# Patient Record
Sex: MALE | Race: WHITE | NOT HISPANIC OR LATINO | Employment: FULL TIME | URBAN - METROPOLITAN AREA
[De-identification: names, ages, dates, MRNs, and addresses within clinical notes are randomized per-mention and may not be internally consistent; named-entity substitution may affect disease eponyms.]

---

## 2017-01-19 RX ORDER — LOSARTAN POTASSIUM 50 MG/1
TABLET ORAL
Qty: 60 TABLET | Refills: 2 | Status: SHIPPED | OUTPATIENT
Start: 2017-01-19 | End: 2017-10-18 | Stop reason: SDUPTHER

## 2017-01-30 ENCOUNTER — TELEPHONE (OUTPATIENT)
Dept: FAMILY MEDICINE | Facility: CLINIC | Age: 52
End: 2017-01-30

## 2017-01-30 NOTE — TELEPHONE ENCOUNTER
Spoke to pt. Advised rx was sent in and also medical records request will be handled by medical records. Call transferred.

## 2017-01-30 NOTE — TELEPHONE ENCOUNTER
----- Message from Adamaris Palomo sent at 1/27/2017  2:30 PM CST -----  pharmacy sent refill request for losartan ... His company sent a request for medical records/ asking for an update .. Not sure if the request went to medical records  ... Call pt [phone]   [facility] [address] [address]  Phone: [phone] Fax: [phone]

## 2017-07-24 RX ORDER — HYDROCHLOROTHIAZIDE 25 MG/1
TABLET ORAL
Qty: 30 TABLET | OUTPATIENT
Start: 2017-07-24

## 2017-07-24 RX ORDER — LOSARTAN POTASSIUM 50 MG/1
TABLET ORAL
Qty: 60 TABLET | OUTPATIENT
Start: 2017-07-24

## 2017-10-18 ENCOUNTER — OFFICE VISIT (OUTPATIENT)
Dept: FAMILY MEDICINE | Facility: CLINIC | Age: 52
End: 2017-10-18
Payer: COMMERCIAL

## 2017-10-18 ENCOUNTER — LAB VISIT (OUTPATIENT)
Dept: LAB | Facility: HOSPITAL | Age: 52
End: 2017-10-18
Attending: NURSE PRACTITIONER
Payer: COMMERCIAL

## 2017-10-18 VITALS
HEART RATE: 55 BPM | TEMPERATURE: 98 F | DIASTOLIC BLOOD PRESSURE: 106 MMHG | HEIGHT: 70 IN | RESPIRATION RATE: 16 BRPM | SYSTOLIC BLOOD PRESSURE: 152 MMHG | OXYGEN SATURATION: 96 % | WEIGHT: 245.56 LBS | BODY MASS INDEX: 35.15 KG/M2

## 2017-10-18 DIAGNOSIS — R53.83 FATIGUE, UNSPECIFIED TYPE: ICD-10-CM

## 2017-10-18 DIAGNOSIS — I10 HYPERTENSION, UNSPECIFIED TYPE: Primary | ICD-10-CM

## 2017-10-18 DIAGNOSIS — Z11.59 NEED FOR HEPATITIS C SCREENING TEST: ICD-10-CM

## 2017-10-18 DIAGNOSIS — I10 HYPERTENSION, UNSPECIFIED TYPE: ICD-10-CM

## 2017-10-18 DIAGNOSIS — G47.33 OSA (OBSTRUCTIVE SLEEP APNEA): ICD-10-CM

## 2017-10-18 LAB
ALBUMIN SERPL BCP-MCNC: 3.6 G/DL
ALP SERPL-CCNC: 71 U/L
ALT SERPL W/O P-5'-P-CCNC: 49 U/L
ANION GAP SERPL CALC-SCNC: 6 MMOL/L
AST SERPL-CCNC: 41 U/L
BASOPHILS # BLD AUTO: 0.03 K/UL
BASOPHILS NFR BLD: 0.7 %
BILIRUB SERPL-MCNC: 0.6 MG/DL
BUN SERPL-MCNC: 16 MG/DL
CALCIUM SERPL-MCNC: 9.3 MG/DL
CHLORIDE SERPL-SCNC: 107 MMOL/L
CHOLEST SERPL-MCNC: 212 MG/DL
CHOLEST/HDLC SERPL: 4.4 {RATIO}
CO2 SERPL-SCNC: 29 MMOL/L
CREAT SERPL-MCNC: 1.1 MG/DL
DIFFERENTIAL METHOD: ABNORMAL
EOSINOPHIL # BLD AUTO: 0.2 K/UL
EOSINOPHIL NFR BLD: 4.3 %
ERYTHROCYTE [DISTWIDTH] IN BLOOD BY AUTOMATED COUNT: 13.8 %
EST. GFR  (AFRICAN AMERICAN): >60 ML/MIN/1.73 M^2
EST. GFR  (NON AFRICAN AMERICAN): >60 ML/MIN/1.73 M^2
GLUCOSE SERPL-MCNC: 106 MG/DL
HCT VFR BLD AUTO: 45.4 %
HDLC SERPL-MCNC: 48 MG/DL
HDLC SERPL: 22.6 %
HGB BLD-MCNC: 14.2 G/DL
IMM GRANULOCYTES # BLD AUTO: 0.01 K/UL
IMM GRANULOCYTES NFR BLD AUTO: 0.2 %
LDLC SERPL CALC-MCNC: 138.2 MG/DL
LYMPHOCYTES # BLD AUTO: 2.4 K/UL
LYMPHOCYTES NFR BLD: 55.6 %
MCH RBC QN AUTO: 26.8 PG
MCHC RBC AUTO-ENTMCNC: 31.3 G/DL
MCV RBC AUTO: 86 FL
MONOCYTES # BLD AUTO: 0.4 K/UL
MONOCYTES NFR BLD: 9 %
NEUTROPHILS # BLD AUTO: 1.3 K/UL
NEUTROPHILS NFR BLD: 30.2 %
NONHDLC SERPL-MCNC: 164 MG/DL
NRBC BLD-RTO: 0 /100 WBC
PLATELET # BLD AUTO: 261 K/UL
PMV BLD AUTO: 11.4 FL
POTASSIUM SERPL-SCNC: 4.4 MMOL/L
PROT SERPL-MCNC: 7.3 G/DL
RBC # BLD AUTO: 5.29 M/UL
SODIUM SERPL-SCNC: 142 MMOL/L
TRIGL SERPL-MCNC: 129 MG/DL
WBC # BLD AUTO: 4.23 K/UL

## 2017-10-18 PROCEDURE — 99214 OFFICE O/P EST MOD 30 MIN: CPT | Mod: S$GLB,,, | Performed by: NURSE PRACTITIONER

## 2017-10-18 PROCEDURE — 86803 HEPATITIS C AB TEST: CPT

## 2017-10-18 PROCEDURE — 99999 PR PBB SHADOW E&M-EST. PATIENT-LVL III: CPT | Mod: PBBFAC,,, | Performed by: NURSE PRACTITIONER

## 2017-10-18 PROCEDURE — 80053 COMPREHEN METABOLIC PANEL: CPT

## 2017-10-18 PROCEDURE — 80061 LIPID PANEL: CPT

## 2017-10-18 PROCEDURE — 85025 COMPLETE CBC W/AUTO DIFF WBC: CPT

## 2017-10-18 PROCEDURE — 36415 COLL VENOUS BLD VENIPUNCTURE: CPT | Mod: PO

## 2017-10-18 RX ORDER — HYDROCHLOROTHIAZIDE 25 MG/1
25 TABLET ORAL DAILY
Qty: 90 TABLET | Refills: 1 | Status: SHIPPED | OUTPATIENT
Start: 2017-10-18 | End: 2018-02-08 | Stop reason: SDUPTHER

## 2017-10-18 RX ORDER — LOSARTAN POTASSIUM 50 MG/1
50 TABLET ORAL 2 TIMES DAILY
Qty: 180 TABLET | Refills: 1 | Status: SHIPPED | OUTPATIENT
Start: 2017-10-18 | End: 2018-02-08 | Stop reason: SDUPTHER

## 2017-10-18 NOTE — PROGRESS NOTES
Subjective:       Patient ID: Ken Lozord Barthelemy is a 52 y.o. male.    Chief Complaint: Hypertension (blood pressure meds )    Here for med refill. Ran out of HCTZ and losartan 2 months ago.   /80     Has been snoring - seen by Dr Rene  - and never did study   Fatigue - had been exploring testosterone - and had labs and studies  Took testosterone with no relief in fatigue - so he stopped it   He states that he works 16 hrs and sleeps hard when he gets home + snoring at home          Hypertension   This is a chronic problem. The current episode started more than 1 month ago. The problem is uncontrolled. Pertinent negatives include no anxiety, blurred vision, chest pain, headaches, malaise/fatigue, neck pain, orthopnea, palpitations, peripheral edema, PND, shortness of breath or sweats. Risk factors for coronary artery disease include male gender, obesity and sedentary lifestyle. Past treatments include angiotensin blockers and diuretics.     Vitals:    10/18/17 0754   BP: (!) 152/106   Pulse: (!) 55   Resp: 16   Temp: 98.1 °F (36.7 °C)     Review of Systems   Constitutional: Negative.  Negative for fatigue, fever and malaise/fatigue.   HENT: Negative.    Eyes: Negative.  Negative for blurred vision.   Respiratory: Negative.  Negative for cough and shortness of breath.    Cardiovascular: Negative.  Negative for chest pain, palpitations, orthopnea and PND.   Gastrointestinal: Negative.  Negative for abdominal pain, diarrhea and nausea.   Endocrine: Negative.    Genitourinary: Negative.  Negative for dysuria and hematuria.   Musculoskeletal: Positive for arthralgias. Negative for neck pain.   Skin: Negative.  Negative for color change and rash.   Allergic/Immunologic: Negative.    Neurological: Negative.  Negative for numbness and headaches.   Hematological: Negative.    Psychiatric/Behavioral: Negative.        Past Medical History:   Diagnosis Date    Diverticulitis 2005    May 2005 and Nov 2005     Hypertension      Objective:      Physical Exam   Constitutional: He is oriented to person, place, and time. He appears well-developed and well-nourished.   HENT:   Head: Normocephalic and atraumatic.   Mouth/Throat: Oropharynx is clear and moist.   Eyes: Conjunctivae and EOM are normal. Pupils are equal, round, and reactive to light.   Neck: Normal range of motion. Neck supple.   Cardiovascular: Normal rate, regular rhythm, normal heart sounds and intact distal pulses.  Exam reveals no friction rub.    No murmur heard.  Pulmonary/Chest: Effort normal and breath sounds normal. No respiratory distress. He has no wheezes. He has no rales.   Abdominal: Soft. Bowel sounds are normal.   Musculoskeletal: Normal range of motion.   Neurological: He is alert and oriented to person, place, and time.   Skin: Skin is warm and dry.   Psychiatric: He has a normal mood and affect. His behavior is normal. Judgment and thought content normal.   Nursing note and vitals reviewed.      Assessment:       1. Hypertension, unspecified type    2. REENA (obstructive sleep apnea)    3. Need for hepatitis C screening test    4. Fatigue, unspecified type        Plan:       Hypertension, unspecified type  -     CBC auto differential; Future; Expected date: 10/18/2017  -     Comprehensive metabolic panel; Future; Expected date: 10/18/2017  -     Lipid panel; Future; Expected date: 10/18/2017  -     Microalbumin/creatinine urine ratio; Future; Expected date: 10/18/2017      losartan (COZAAR) 50 MG tablet; Take 1 tablet (50 mg total) by mouth 2 (two) times daily.  Dispense: 180 tablet; Refill: 1  -     hydrochlorothiazide (HYDRODIURIL) 25 MG tablet; Take 1 tablet (25 mg total) by mouth once daily.  Dispense: 90 tablet; Refill: 1      REENA (obstructive sleep apnea)   - never got study - will explore if bp remains elevated     Need for hepatitis C screening test  -     HEPATITIS C ANTIBODY; Future; Expected date: 10/18/2017    Fatigue, unspecified  type  - discussed that will explore sleep study if not better   He discussed that he feels that this is related to working long hours     Labs today - will FU   Refused flu today   Make appt with PCP     -         No Follow-up on file.

## 2017-10-19 LAB — HCV AB SERPL QL IA: NEGATIVE

## 2017-10-23 ENCOUNTER — TELEPHONE (OUTPATIENT)
Dept: FAMILY MEDICINE | Facility: CLINIC | Age: 52
End: 2017-10-23

## 2017-10-23 DIAGNOSIS — R79.89 ELEVATED LFTS: Primary | ICD-10-CM

## 2017-10-23 NOTE — TELEPHONE ENCOUNTER
Please let him know that all his labs look good except that his liver functions have increased - I want him to decrease alcohol intake and OTC advil and tylenol  - I want him to repeat this hepatic panel in 2 weeks    Also make sure healthy eating -= watch high chol foods

## 2017-10-24 ENCOUNTER — PATIENT MESSAGE (OUTPATIENT)
Dept: FAMILY MEDICINE | Facility: CLINIC | Age: 52
End: 2017-10-24

## 2017-10-24 NOTE — TELEPHONE ENCOUNTER
Most insurance do not pay for these tests with just history of illnesses - If he does not have symptoms then most are hard to get authorized. He would need to contact the place that did the physical or see a PCP here earlier to have information documented and symptoms and BP follow up for more documentation to try to get it authorized

## 2017-10-24 NOTE — TELEPHONE ENCOUNTER
Patient is stating that work is requiring him to have a stress test. Wants you to order? Has appt scheduled with Dr Rene 2/8/18

## 2017-10-24 NOTE — TELEPHONE ENCOUNTER
Why is his job asking for stress test? He has HTN - that he was not taking his meds for - I would need more information

## 2017-10-24 NOTE — TELEPHONE ENCOUNTER
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 Stated that they are wanting him to do this because of his blood pressure and cholesterol. Please advise

## 2017-11-06 ENCOUNTER — LAB VISIT (OUTPATIENT)
Dept: LAB | Facility: HOSPITAL | Age: 52
End: 2017-11-06
Attending: NURSE PRACTITIONER
Payer: COMMERCIAL

## 2017-11-06 ENCOUNTER — CLINICAL SUPPORT (OUTPATIENT)
Dept: FAMILY MEDICINE | Facility: CLINIC | Age: 52
End: 2017-11-06
Payer: COMMERCIAL

## 2017-11-06 VITALS — OXYGEN SATURATION: 97 % | DIASTOLIC BLOOD PRESSURE: 82 MMHG | HEART RATE: 58 BPM | SYSTOLIC BLOOD PRESSURE: 126 MMHG

## 2017-11-06 DIAGNOSIS — I10 HYPERTENSION, UNSPECIFIED TYPE: Primary | ICD-10-CM

## 2017-11-06 DIAGNOSIS — R79.89 ELEVATED LFTS: ICD-10-CM

## 2017-11-06 LAB
ALBUMIN SERPL BCP-MCNC: 3.6 G/DL
ALP SERPL-CCNC: 80 U/L
ALT SERPL W/O P-5'-P-CCNC: 44 U/L
AST SERPL-CCNC: 27 U/L
BILIRUB DIRECT SERPL-MCNC: 0.2 MG/DL
BILIRUB SERPL-MCNC: 0.4 MG/DL
PROT SERPL-MCNC: 7.4 G/DL

## 2017-11-06 PROCEDURE — 80076 HEPATIC FUNCTION PANEL: CPT

## 2017-11-06 PROCEDURE — 99999 PR PBB SHADOW E&M-EST. PATIENT-LVL I: CPT | Mod: PBBFAC,,,

## 2017-11-06 PROCEDURE — 36415 COLL VENOUS BLD VENIPUNCTURE: CPT | Mod: PO

## 2017-11-06 NOTE — PROGRESS NOTES
Ken Lozord Barthelemy 52 y.o. male is here today for Blood Pressure check.   History of HTN yes.    Review of patient's allergies indicates:  No Known Allergies  Creatinine   Date Value Ref Range Status   10/18/2017 1.1 0.5 - 1.4 mg/dL Final     Sodium   Date Value Ref Range Status   10/18/2017 142 136 - 145 mmol/L Final     Potassium   Date Value Ref Range Status   10/18/2017 4.4 3.5 - 5.1 mmol/L Final   ]  Patient verifies taking blood pressure medications on a regular basis at the same time of the day.     Current Outpatient Prescriptions:     hydrochlorothiazide (HYDRODIURIL) 25 MG tablet, Take 1 tablet (25 mg total) by mouth once daily., Disp: 90 tablet, Rfl: 1    losartan (COZAAR) 50 MG tablet, Take 1 tablet (50 mg total) by mouth 2 (two) times daily., Disp: 180 tablet, Rfl: 1  Does patient have record of home blood pressure readings yes. Readings have been averaging 124/80.   Last dose of blood pressure medication was taken at this morning at 0800.  Patient is asymptomatic.   No complaints at this time    ,   .     Blood pressure reading after 15 minutes was 126/82, Pulse 58.  Vickie Majano NP notified.

## 2018-02-08 ENCOUNTER — OFFICE VISIT (OUTPATIENT)
Dept: FAMILY MEDICINE | Facility: CLINIC | Age: 53
End: 2018-02-08
Payer: COMMERCIAL

## 2018-02-08 ENCOUNTER — HOSPITAL ENCOUNTER (OUTPATIENT)
Dept: RADIOLOGY | Facility: HOSPITAL | Age: 53
Discharge: HOME | End: 2018-02-08
Attending: INTERNAL MEDICINE
Payer: COMMERCIAL

## 2018-02-08 VITALS
WEIGHT: 247.81 LBS | BODY MASS INDEX: 35.48 KG/M2 | OXYGEN SATURATION: 98 % | HEIGHT: 70 IN | RESPIRATION RATE: 18 BRPM | SYSTOLIC BLOOD PRESSURE: 118 MMHG | DIASTOLIC BLOOD PRESSURE: 80 MMHG | HEART RATE: 70 BPM

## 2018-02-08 DIAGNOSIS — M25.562 ACUTE PAIN OF LEFT KNEE: ICD-10-CM

## 2018-02-08 DIAGNOSIS — I10 ESSENTIAL HYPERTENSION: ICD-10-CM

## 2018-02-08 DIAGNOSIS — L30.9 ECZEMA, UNSPECIFIED TYPE: ICD-10-CM

## 2018-02-08 DIAGNOSIS — Z00.00 ROUTINE PHYSICAL EXAMINATION: Primary | ICD-10-CM

## 2018-02-08 DIAGNOSIS — R74.01 TRANSAMINITIS: ICD-10-CM

## 2018-02-08 DIAGNOSIS — E78.5 DYSLIPIDEMIA: ICD-10-CM

## 2018-02-08 PROCEDURE — 99396 PREV VISIT EST AGE 40-64: CPT | Mod: S$GLB,,, | Performed by: INTERNAL MEDICINE

## 2018-02-08 PROCEDURE — 73564 X-RAY EXAM KNEE 4 OR MORE: CPT | Mod: 26,50,, | Performed by: RADIOLOGY

## 2018-02-08 PROCEDURE — 99999 PR PBB SHADOW E&M-EST. PATIENT-LVL III: CPT | Mod: PBBFAC,,, | Performed by: INTERNAL MEDICINE

## 2018-02-08 PROCEDURE — 73564 X-RAY EXAM KNEE 4 OR MORE: CPT | Mod: TC,50,FY,PO

## 2018-02-08 RX ORDER — HYDROCHLOROTHIAZIDE 25 MG/1
25 TABLET ORAL DAILY
Qty: 6 TABLET | Refills: 0 | Status: SHIPPED | OUTPATIENT
Start: 2018-02-08 | End: 2018-02-08 | Stop reason: SDUPTHER

## 2018-02-08 RX ORDER — LOSARTAN POTASSIUM 50 MG/1
50 TABLET ORAL 2 TIMES DAILY
Qty: 180 TABLET | Refills: 2
Start: 2018-02-08 | End: 2018-08-29 | Stop reason: SDUPTHER

## 2018-02-08 RX ORDER — HYDROCHLOROTHIAZIDE 25 MG/1
25 TABLET ORAL DAILY
Qty: 90 TABLET | Refills: 2
Start: 2018-02-08 | End: 2018-08-29 | Stop reason: SDUPTHER

## 2018-02-08 RX ORDER — LOSARTAN POTASSIUM 50 MG/1
50 TABLET ORAL 2 TIMES DAILY
Qty: 12 TABLET | Refills: 0 | Status: SHIPPED | OUTPATIENT
Start: 2018-02-08 | End: 2018-02-08 | Stop reason: SDUPTHER

## 2018-02-08 NOTE — PROGRESS NOTES
Subjective:       Patient ID: Ken Lozord Barthelemy is a 52 y.o. male.    Chief Complaint: Annual Exam    Here for routine health maintenance.    Complains of rash that improved with lotion  Complains of left knee stiffness and swelling with mild pain starting 2 days ago.  HTN - controlled  HLD - mildly uncontrolled LDL goal < 130 age, htn  Elevated LFT - mild last lab only      Review of Systems   Constitutional: Negative for appetite change and fever.   HENT: Negative for nosebleeds and trouble swallowing.    Eyes: Negative for discharge and visual disturbance.   Respiratory: Negative for choking and shortness of breath.    Cardiovascular: Negative for chest pain and palpitations.   Gastrointestinal: Negative for abdominal pain, nausea and vomiting.   Musculoskeletal: Positive for arthralgias. Negative for joint swelling.   Skin: Negative for rash and wound.   Neurological: Negative for dizziness and syncope.   Psychiatric/Behavioral: Negative for confusion and dysphoric mood.       Objective:      Vitals:    02/08/18 0814   BP: 118/80   Pulse: 70   Resp: 18     Physical Exam   Constitutional: He appears well-nourished.   Eyes: Conjunctivae and EOM are normal.   Neck: Trachea normal and normal range of motion. No thyromegaly present.   Cardiovascular: Normal heart sounds.    Edema negative   Pulmonary/Chest: Effort normal and breath sounds normal.   Abdominal: Soft. There is no hepatomegaly.   Musculoskeletal:   ROM normal bilateral  Strength normal bilateral  Left knee mildly swollen; non TTP   Neurological: He has normal reflexes. No cranial nerve deficit.   Skin: Skin is warm, dry and intact.   Psychiatric: He has a normal mood and affect.   Alert and Oriented    Vitals reviewed.        Assessment:       1. Routine physical examination    2. Essential hypertension    3. Dyslipidemia    4. Transaminitis    5. Acute pain of left knee    6. Eczema, unspecified type        Plan:       Routine physical  "examination    Essential hypertension  -     Discontinue: hydroCHLOROthiazide (HYDRODIURIL) 25 MG tablet; Take 1 tablet (25 mg total) by mouth once daily.  Dispense: 6 tablet; Refill: 0  -     Discontinue: losartan (COZAAR) 50 MG tablet; Take 1 tablet (50 mg total) by mouth 2 (two) times daily.  Dispense: 12 tablet; Refill: 0  -     Comprehensive metabolic panel; Future; Expected date: 02/08/2018  -     losartan (COZAAR) 50 MG tablet; Take 1 tablet (50 mg total) by mouth 2 (two) times daily.  Dispense: 180 tablet; Refill: 2  -     hydroCHLOROthiazide (HYDRODIURIL) 25 MG tablet; Take 1 tablet (25 mg total) by mouth once daily.  Dispense: 90 tablet; Refill: 2    Dyslipidemia  -     Lipid panel; Future; Expected date: 02/08/2018    Transaminitis  -     Comprehensive metabolic panel; Future; Expected date: 02/08/2018    Acute pain of left knee  -     Ambulatory referral to Physical Medicine Rehab  -     X-ray Knee Ortho Bilateral with Flexion; Future; Expected date: 02/08/2018    Eczema, unspecified type      Medication List with Changes/Refills   Changed and/or Refilled Medications    Modified Medication Previous Medication    HYDROCHLOROTHIAZIDE (HYDRODIURIL) 25 MG TABLET hydrochlorothiazide (HYDRODIURIL) 25 MG tablet       Take 1 tablet (25 mg total) by mouth once daily.    Take 1 tablet (25 mg total) by mouth once daily.    LOSARTAN (COZAAR) 50 MG TABLET losartan (COZAAR) 50 MG tablet       Take 1 tablet (50 mg total) by mouth 2 (two) times daily.    Take 1 tablet (50 mg total) by mouth 2 (two) times daily.     Wellness reviewed        Counseled on regular exercise, maintenance of a healthy weight, balanced diet rich in fruits/vegetables and lean protein, and avoidance of unhealthy habits like smoking and excessive alcohol intake.   Also, counseled on importance of being compliant with medication, health appointments, diet and exercise.     Follow-up in about 1 year (around 2/8/2019).      "This note will not be " "shared with the patient."  "

## 2018-02-22 ENCOUNTER — INITIAL CONSULT (OUTPATIENT)
Dept: PHYSICAL MEDICINE AND REHAB | Facility: CLINIC | Age: 53
End: 2018-02-22
Payer: COMMERCIAL

## 2018-02-22 VITALS
DIASTOLIC BLOOD PRESSURE: 80 MMHG | BODY MASS INDEX: 35.36 KG/M2 | HEIGHT: 70 IN | HEART RATE: 62 BPM | WEIGHT: 247 LBS | SYSTOLIC BLOOD PRESSURE: 132 MMHG

## 2018-02-22 DIAGNOSIS — M71.22 BAKER'S CYST OF KNEE, LEFT: ICD-10-CM

## 2018-02-22 DIAGNOSIS — G89.29 CHRONIC PAIN OF LEFT KNEE: Primary | ICD-10-CM

## 2018-02-22 DIAGNOSIS — M25.562 CHRONIC PAIN OF LEFT KNEE: Primary | ICD-10-CM

## 2018-02-22 PROCEDURE — 99999 PR PBB SHADOW E&M-EST. PATIENT-LVL III: CPT | Mod: PBBFAC,,, | Performed by: PHYSICAL MEDICINE & REHABILITATION

## 2018-02-22 PROCEDURE — 20611 DRAIN/INJ JOINT/BURSA W/US: CPT | Mod: LT,S$GLB,, | Performed by: PHYSICAL MEDICINE & REHABILITATION

## 2018-02-22 PROCEDURE — 99243 OFF/OP CNSLTJ NEW/EST LOW 30: CPT | Mod: 25,S$GLB,, | Performed by: PHYSICAL MEDICINE & REHABILITATION

## 2018-02-22 RX ORDER — BETAMETHASONE SODIUM PHOSPHATE AND BETAMETHASONE ACETATE 3; 3 MG/ML; MG/ML
6 INJECTION, SUSPENSION INTRA-ARTICULAR; INTRALESIONAL; INTRAMUSCULAR; SOFT TISSUE
Status: DISCONTINUED | OUTPATIENT
Start: 2018-02-22 | End: 2018-02-22 | Stop reason: HOSPADM

## 2018-02-22 RX ADMIN — BETAMETHASONE SODIUM PHOSPHATE AND BETAMETHASONE ACETATE 6 MG: 3; 3 INJECTION, SUSPENSION INTRA-ARTICULAR; INTRALESIONAL; INTRAMUSCULAR; SOFT TISSUE at 01:02

## 2018-02-22 NOTE — PROGRESS NOTES
[facility]    Consulting Provider: Dr. Ernesto Rene    CHIEF COMPLAINT:   Chief Complaint   Patient presents with    Knee Pain     left knee pain     HISTORY OF PRESENT ILLNESS: Ken Lozord Barthelemy is a 52 y.o. male who presents to me for the first time for evaluation and treatment of left knee pain.  The pain started 2 weeks ago insidiously without any trauma or injury.  He rates his pain as a 2 on a scale of 1-10.  He describes the pain is pressure.  He feels there was some swelling to the swelling is reduced.  He locates his primary area pain of the posterior aspect of the knee.  He feels the area is very tight.  He has been taking ibuprofen which does seem to help.  He's had a prior traumatic injuries or surgeries to the left knee.  He denies any numbness or tingling.  There is no grinding or locking symptoms.    Review of Systems   Constitutional: Negative for fever.   HENT: Negative for drooling.    Eyes: Negative for discharge.   Respiratory: Negative for choking.    Cardiovascular: Negative for chest pain.   Genitourinary: Negative for flank pain.   Skin: Negative for wound.   Allergic/Immunologic: Negative for immunocompromised state.   Neurological: Negative for tremors and syncope.   Psychiatric/Behavioral: Negative for behavioral problems.     Past Medical History:   Past Medical History:   Diagnosis Date    Diverticulitis 2005    May 2005 and Nov 2005    Hypertension        Past Surgical History:   Past Surgical History:   Procedure Laterality Date    APPENDECTOMY  11/2005    COLONOSCOPY  4/26/2005  [facility]    Diverticulosis    COLONOSCOPY N/A 9/18/2015    Procedure: COLONOSCOPY;  Surgeon: Yahir Serrano Jr., MD;  [facility];  Service: Endoscopy;  Laterality: N/A;    MULTIPLE TOOTH EXTRACTIONS      SIGMOIDECTOMY  [facility]/17/2006  [facility]    Laparoscopic sigmoidectomy, for recurrent diverticulitis.       Family History:   Family History   Problem Relation Age of Onset  "   Heart disease Father        Medications:   Current Outpatient Prescriptions on File Prior to Visit   Medication Sig Dispense Refill    hydroCHLOROthiazide (HYDRODIURIL) 25 MG tablet Take 1 tablet (25 mg total) by mouth once daily. 90 tablet 2    losartan (COZAAR) 50 MG tablet Take 1 tablet (50 mg total) by mouth 2 (two) times daily. 180 tablet 2     No current facility-administered medications on file prior to visit.        Allergies: Review of patient's allergies indicates:  No Known Allergies    Social History:   Social History     Social History    Marital status:      Spouse name: N/A    Number of children: N/A    Years of education: N/A     Social History Main Topics    Smoking status: Former Smoker     Types: Cigars     Quit date: 9/28/2012    Smokeless tobacco: Never Used    Alcohol use 1.2 oz/week     2 Shots of liquor per week      Comment: 1-2 glasses/day    Drug use: No    Sexual activity: Not Asked     Other Topics Concern    None     Social History Narrative    None     Rayo officiates high school football.    PHYSICAL EXAMINATION:   General    Vitals:    02/22/18 1306   BP: 132/80   Pulse: 62   Weight: 112 kg (247 lb)   Height: 5' 10" (1.778 m)     Constitutional: Oriented to person, place, and time. No apparent distress. Appears well-developed and well-nourished. Pleasant.  HENT:   Head: Normocephalic and atraumatic.   Eyes: Right eye exhibits no discharge. Left eye exhibits no discharge. No scleral icterus.   Pulmonary/Chest: Effort normal. No respiratory distress.   Abdominal: There is no guarding.   Neurological: Alert and oriented to person, place, and time.   Psychiatric: Behavior is normal.   Right Knee Exam     Tenderness   The patient is experiencing no tenderness.         Range of Motion   Extension: normal   Flexion: normal     Other   Erythema: absent  Scars: absent  Sensation: normal  Pulse: present  Swelling: none  Other tests: no effusion present      Left Knee Exam " "    Tenderness   Left knee tenderness location: Popliteal fossa.    Range of Motion   Extension: 0   Flexion: 130 (Posterior knee pain with terminal flexion)     Tests   Brina:  Medial - negative Lateral - negative  Lachman:  Anterior - negative    Posterior - negative  Varus: negative  Valgus: negative  Patellar Apprehension: negative    Other   Erythema: absent  Scars: absent  Sensation: normal  Pulse: present  Swelling: mild  Effusion: no effusion present        INSPECTION: There is no swelling, ecchymoses, erythema or gross deformity about the left knee.  GAIT/DYNAMIC: Normal gait.    Imaging  X-ray of left knee from 2/8/2018: Medial compartment narrowing on the left greater than right is noted. No significant right-sided suprapatellar joint effusion is noted. The patella appear well positioned within the intercondylar notches bilaterally. A small suprapatellar joint effusion on the left is noted.    Data Reviewed: X-ray    Supportive Actions: Independent visualization of images or test specimens    ASSESSMENT:   1. Chronic pain of left knee    2. Baker's cyst of knee, left      PLAN:     1. Time was spent reviewing the above diagnosis in depth with Rayo today, including acute management and rehabilitation.     2.  He has signs and symptoms suggestive of left knee Baker's cyst, which was confirmed on diagnostic ultrasound exam.  I offered ultrasound-guided aspiration and injection of corticosteroid today, and he wished to proceed, see separate procedure note.    3. RTC in 2-4 weeks if symptoms are not improved.    This is a consult from Dr. Ernesto Rene. Please see the "Communications" section of Epic to see how the consulting physician received the report of today's findings and recommendations. If it's an [facility] physician, it will be forwarded to his/her "in basket".    The above note was completed, in part, with the aid of Dragon dictation software/hardware. Translation errors may be present.    "

## 2018-02-22 NOTE — PROCEDURES
Large Joint Aspiration/Injection  Date/Time: 2/22/2018 1:43 PM  Performed by: TANNA COBB  Authorized by: TANNA COBB     Consent Done?:  Yes (Verbal)  Indications:  Pain and joint swelling  Procedure site marked: Yes    Timeout: Prior to procedure the correct patient, procedure, and site was verified      Location:  Knee  Site:  L knee  Prep: Patient was prepped and draped in usual sterile fashion    Ultrasonic Guidance for needle placement: Yes  Images are saved and documented.  Needle size:  18 G  Approach: Needle in plane, distal to proximal.  Medications:  6 mg betamethasone acetate-betamethasone sodium phosphate 6 mg/mL  Aspirate amount (ml):  3  Aspirate:  Serous  Patient tolerance:  Patient tolerated the procedure well with no immediate complications    Additional Comments: Ultrasound guidance was used for correct needle placement, the images were saved will be uploaded to EMR.

## 2018-02-22 NOTE — LETTER
February 22, 2018      Ernesto Rene MD  [address] [facility] [address] [place] [address]           [facility]  [address] [facility] [address] [place] [address]  Phone: [phone]  Fax: [phone]          Patient: Ken Lozord Barthelemy   MR Number: [id number]   YOB: 1965   Date of Visit: 2/22/2018       Dear Dr. Ernesto Rene:    Thank you for referring Ken Barthelemy to me for evaluation. Attached you will find relevant portions of my assessment and plan of care.    If you have questions, please do not hesitate to call me. I look forward to following Ken Barthelemy along with you.    Sincerely,    Abisai Bond MD    Enclosure  CC:  No Recipients    If you would like to receive this communication electronically, please contact [email or web address] or [phone] to request more information on [employer] Link access.    For providers and/or their staff who would like to refer a patient to [facility], please contact us through our one-stop-shop provider referral line, [facility], at [phone].    If you feel you have received this communication in error or would no longer like to receive these types of communications, please e-mail [email or web address]

## 2018-03-13 ENCOUNTER — OFFICE VISIT (OUTPATIENT)
Dept: PHYSICAL MEDICINE AND REHAB | Facility: CLINIC | Age: 53
End: 2018-03-13
Payer: COMMERCIAL

## 2018-03-13 VITALS
DIASTOLIC BLOOD PRESSURE: 91 MMHG | SYSTOLIC BLOOD PRESSURE: 122 MMHG | HEART RATE: 67 BPM | HEIGHT: 70 IN | BODY MASS INDEX: 35.36 KG/M2 | WEIGHT: 247 LBS

## 2018-03-13 DIAGNOSIS — M17.12 PRIMARY OSTEOARTHRITIS OF LEFT KNEE: ICD-10-CM

## 2018-03-13 DIAGNOSIS — M25.562 CHRONIC PAIN OF LEFT KNEE: Primary | ICD-10-CM

## 2018-03-13 DIAGNOSIS — G89.29 CHRONIC PAIN OF LEFT KNEE: Primary | ICD-10-CM

## 2018-03-13 PROCEDURE — 20611 DRAIN/INJ JOINT/BURSA W/US: CPT | Mod: LT,S$GLB,, | Performed by: PHYSICAL MEDICINE & REHABILITATION

## 2018-03-13 PROCEDURE — 99213 OFFICE O/P EST LOW 20 MIN: CPT | Mod: 25,S$GLB,, | Performed by: PHYSICAL MEDICINE & REHABILITATION

## 2018-03-13 PROCEDURE — 3080F DIAST BP >= 90 MM HG: CPT | Mod: CPTII,S$GLB,, | Performed by: PHYSICAL MEDICINE & REHABILITATION

## 2018-03-13 PROCEDURE — 3074F SYST BP LT 130 MM HG: CPT | Mod: CPTII,S$GLB,, | Performed by: PHYSICAL MEDICINE & REHABILITATION

## 2018-03-13 PROCEDURE — 99999 PR PBB SHADOW E&M-EST. PATIENT-LVL III: CPT | Mod: PBBFAC,,, | Performed by: PHYSICAL MEDICINE & REHABILITATION

## 2018-03-13 RX ORDER — TRIAMCINOLONE ACETONIDE 40 MG/ML
40 INJECTION, SUSPENSION INTRA-ARTICULAR; INTRAMUSCULAR
Status: DISCONTINUED | OUTPATIENT
Start: 2018-03-13 | End: 2018-03-13 | Stop reason: HOSPADM

## 2018-03-13 RX ADMIN — TRIAMCINOLONE ACETONIDE 40 MG: 40 INJECTION, SUSPENSION INTRA-ARTICULAR; INTRAMUSCULAR at 11:03

## 2018-03-13 NOTE — PROGRESS NOTES
"[facility]    CHIEF COMPLAINT:   Chief Complaint   Patient presents with    Follow-up     left knee pain     HISTORY OF PRESENT ILLNESS: Ken Lozord Barthelemy is a 52 y.o. male who presents to me for follow up and evaluation and treatment of left knee pain. He was last seen in clinic on 2/22/2018 for an initial visit. At that time, he was provided a left knee aspiration and injection with steroid. Since then, he reports that he has not had much benefit from the procedure. His pain is of the same type and quality. The pain started without any trauma or injury in the beginning of Februrary 2018.  He rates his pain as a 2 on a scale of 1-10.  He describes the pain is pressure.  He feels there was some swelling to the swelling is reduced, he and his wife both report increased warmth but no erythema.  He locates his primary area pain over the anteromedial aspect of the knee.  He feels the area is very tight/stiff and has a "popping" sound when he flexes or extends the knee.  He reports that the pain is worse at the end of the day rather than early in the morning. He states that his job requires a lot of stairs which is aggravating. He has been taking ibuprofen which does seem to help.  He denies any prior traumatic injuries or surgeries to the left knee.  He denies any numbness or tingling.  He denies the knee locking up or giving out on him. Only has had one injection, no formal PT, no bracing or sleeves, and no other medications besides OTC NSAIDs/Tylenol.     Review of Systems   Constitutional: Negative for fever.   HENT: Negative for drooling.    Eyes: Negative for discharge.   Respiratory: Negative for choking.    Cardiovascular: Negative for chest pain.   Genitourinary: Negative for flank pain.   Skin: Negative for wound.   Allergic/Immunologic: Negative for immunocompromised state.   Neurological: Negative for tremors and syncope.   Psychiatric/Behavioral: Negative for behavioral problems. " "    Past Medical History:   Past Medical History:   Diagnosis Date    Diverticulitis 2005    May 2005 and Nov 2005    Hypertension        Past Surgical History:   Past Surgical History:   Procedure Laterality Date    APPENDECTOMY  11/2005    COLONOSCOPY  4/26/2005  [facility]    Diverticulosis    COLONOSCOPY N/A 9/18/2015    Procedure: COLONOSCOPY;  Surgeon: Yahir Serrano Jr., MD;  Location: [facility];  Service: Endoscopy;  Laterality: N/A;    MULTIPLE TOOTH EXTRACTIONS      SIGMOIDECTOMY  1/17/2006  [facility]    Laparoscopic sigmoidectomy, for recurrent diverticulitis.       Family History:   Family History   Problem Relation Age of Onset    Heart disease Father        Medications:   Current Outpatient Prescriptions on File Prior to Visit   Medication Sig Dispense Refill    hydroCHLOROthiazide (HYDRODIURIL) 25 MG tablet Take 1 tablet (25 mg total) by mouth once daily. 90 tablet 2    losartan (COZAAR) 50 MG tablet Take 1 tablet (50 mg total) by mouth 2 (two) times daily. 180 tablet 2     No current facility-administered medications on file prior to visit.        Allergies: Review of patient's allergies indicates:  No Known Allergies    Social History:   Social History     Social History    Marital status:      Spouse name: N/A    Number of children: N/A    Years of education: N/A     Social History Main Topics    Smoking status: Former Smoker     Types: Cigars     Quit date: 9/28/2012    Smokeless tobacco: Never Used    Alcohol use 1.2 oz/week     2 Shots of liquor per week      Comment: 1-2 glasses/day    Drug use: No    Sexual activity: Not Asked     Other Topics Concern    None     Social History Narrative    None     Rayo officiates high school football, and he works for a [employer] company which requires him to visit sites and go up/down many stairs.     PHYSICAL EXAMINATION:   General    Vitals:    03/13/18 1020   BP: (!) 122/91   Pulse: 67   Weight: 112 kg (247 lb)   Height: 5' 10" " (1.778 m)     Constitutional: Oriented to person, place, and time. No apparent distress. Appears well-developed and well-nourished. Pleasant.  HENT:   Head: Normocephalic and atraumatic.   Eyes: Right eye exhibits no discharge. Left eye exhibits no discharge. No scleral icterus.   Pulmonary/Chest: Effort normal. No respiratory distress.   Abdominal: There is no guarding.   Neurological: Alert and oriented to person, place, and time.   Psychiatric: Behavior is normal.     Right Knee Exam     Tenderness   The patient is experiencing no tenderness.         Range of Motion   Extension: normal   Flexion: normal     Other   Erythema: absent  Scars: absent  Sensation: normal  Pulse: present  Swelling: none  Other tests: no effusion present      Left Knee Exam     Tenderness   The patient is experiencing tenderness in the medial joint line and patellar tendon.    Range of Motion   Extension: 0   Flexion: 130 (knee pain with terminal flexion)     Muscle Strength     The patient has normal left knee strength.    Tests   Brnia:  Medial - negative Lateral - negative  Lachman:  Anterior - negative    Posterior - negative  Varus: negative  Valgus: negative  Pivot Shift: negative  Patellar Apprehension: negative    Other   Erythema: absent  Scars: absent (current laceration over anterior knee)  Sensation: normal  Pulse: present  Swelling: mild  Effusion: effusion present        INSPECTION: There is no ecchymoses, erythema or gross deformity about the left knee.  GAIT/DYNAMIC: Normal gait.    Imaging  X-ray of left knee from 2/8/2018: Medial compartment narrowing on the left greater than right is noted. No significant right-sided suprapatellar joint effusion is noted. The patella appear well positioned within the intercondylar notches bilaterally. A small suprapatellar joint effusion on the left is noted.    Data Reviewed: X-ray, ultrasound    Supportive Actions: Independent visualization of images or test  specimens    ASSESSMENT:   1. Chronic pain of left knee    2. Primary osteoarthritis of left knee      PLAN:     1. He has signs and symptoms suggestive of osteoarthritis of the left knee which is evident through clinical signs and symptoms and the visualized joint effusion on ultrasound today. We discussed treatment options such as repeat injection with steroid into the intra-articular space of the left knee, gel injections, physical therapy, and bracing. Advised the patient that his findings are not consistent with anything requiring surgical intervention, and that he may continue to get treated nonoperatively. We decided to aspirate and inject steroid into the left knee today, see separate procedure note for details.     2. RTC on an as needed basis.     The above note was completed, in part, with the aid of Dragon dictation software/hardware. Translation errors may be present.

## 2018-03-13 NOTE — PROCEDURES
Large Joint Aspiration/Injection  Date/Time: 3/13/2018 11:25 AM  Performed by: TANNA COBB  Authorized by: TANNA COBB     Consent Done?:  Yes (Verbal)  Indications:  Pain and joint swelling  Procedure site marked: Yes    Timeout: Prior to procedure the correct patient, procedure, and site was verified      Location:  Knee  Site:  L knee  Prep: Patient was prepped and draped in usual sterile fashion    Ultrasonic Guidance for needle placement: Yes  Images are saved and documented.  Needle size:  18 G  Approach: needle in plane, lateral to medial.  Medications:  40 mg triamcinolone acetonide 40 mg/mL  Aspirate amount (ml):  10  Aspirate:  Serous  Patient tolerance:  Patient tolerated the procedure well with no immediate complications    Additional Comments: Ultrasound guidance was used for correct needle placement, the images were saved will be uploaded to EMR.

## 2018-08-29 DIAGNOSIS — I10 ESSENTIAL HYPERTENSION: ICD-10-CM

## 2018-08-30 RX ORDER — LOSARTAN POTASSIUM 50 MG/1
50 TABLET ORAL 2 TIMES DAILY
Qty: 180 TABLET | Refills: 1 | Status: SHIPPED | OUTPATIENT
Start: 2018-08-30 | End: 2019-04-08 | Stop reason: DRUGHIGH

## 2018-08-30 RX ORDER — HYDROCHLOROTHIAZIDE 25 MG/1
25 TABLET ORAL DAILY
Qty: 90 TABLET | Refills: 1 | Status: SHIPPED | OUTPATIENT
Start: 2018-08-30 | End: 2019-04-08

## 2019-04-08 ENCOUNTER — LAB VISIT (OUTPATIENT)
Dept: LAB | Facility: HOSPITAL | Age: 54
End: 2019-04-08
Attending: NURSE PRACTITIONER
Payer: COMMERCIAL

## 2019-04-08 ENCOUNTER — OFFICE VISIT (OUTPATIENT)
Dept: FAMILY MEDICINE | Facility: CLINIC | Age: 54
End: 2019-04-08
Payer: COMMERCIAL

## 2019-04-08 VITALS
BODY MASS INDEX: 35.48 KG/M2 | HEIGHT: 70 IN | WEIGHT: 247.81 LBS | DIASTOLIC BLOOD PRESSURE: 90 MMHG | SYSTOLIC BLOOD PRESSURE: 134 MMHG | OXYGEN SATURATION: 97 % | RESPIRATION RATE: 18 BRPM | HEART RATE: 73 BPM | TEMPERATURE: 98 F

## 2019-04-08 DIAGNOSIS — I10 ESSENTIAL HYPERTENSION: ICD-10-CM

## 2019-04-08 DIAGNOSIS — Z12.5 SCREENING FOR PROSTATE CANCER: ICD-10-CM

## 2019-04-08 DIAGNOSIS — I10 ESSENTIAL HYPERTENSION: Primary | ICD-10-CM

## 2019-04-08 LAB
ALBUMIN SERPL BCP-MCNC: 3.9 G/DL (ref 3.5–5.2)
ALP SERPL-CCNC: 70 U/L (ref 55–135)
ALT SERPL W/O P-5'-P-CCNC: 49 U/L (ref 10–44)
ANION GAP SERPL CALC-SCNC: 6 MMOL/L (ref 8–16)
AST SERPL-CCNC: 29 U/L (ref 10–40)
BASOPHILS # BLD AUTO: 0.04 K/UL (ref 0–0.2)
BASOPHILS NFR BLD: 0.8 % (ref 0–1.9)
BILIRUB SERPL-MCNC: 0.6 MG/DL (ref 0.1–1)
BUN SERPL-MCNC: 17 MG/DL (ref 6–20)
CALCIUM SERPL-MCNC: 9.9 MG/DL (ref 8.7–10.5)
CHLORIDE SERPL-SCNC: 101 MMOL/L (ref 95–110)
CO2 SERPL-SCNC: 33 MMOL/L (ref 23–29)
COMPLEXED PSA SERPL-MCNC: 1.2 NG/ML (ref 0–4)
CREAT SERPL-MCNC: 1.1 MG/DL (ref 0.5–1.4)
DIFFERENTIAL METHOD: ABNORMAL
EOSINOPHIL # BLD AUTO: 0.2 K/UL (ref 0–0.5)
EOSINOPHIL NFR BLD: 3.9 % (ref 0–8)
ERYTHROCYTE [DISTWIDTH] IN BLOOD BY AUTOMATED COUNT: 14.1 % (ref 11.5–14.5)
EST. GFR  (AFRICAN AMERICAN): >60 ML/MIN/1.73 M^2
EST. GFR  (NON AFRICAN AMERICAN): >60 ML/MIN/1.73 M^2
GLUCOSE SERPL-MCNC: 99 MG/DL (ref 70–110)
HCT VFR BLD AUTO: 47.7 % (ref 40–54)
HGB BLD-MCNC: 14.9 G/DL (ref 14–18)
IMM GRANULOCYTES # BLD AUTO: 0.01 K/UL (ref 0–0.04)
IMM GRANULOCYTES NFR BLD AUTO: 0.2 % (ref 0–0.5)
LYMPHOCYTES # BLD AUTO: 3.1 K/UL (ref 1–4.8)
LYMPHOCYTES NFR BLD: 59.8 % (ref 18–48)
MCH RBC QN AUTO: 27.1 PG (ref 27–31)
MCHC RBC AUTO-ENTMCNC: 31.2 G/DL (ref 32–36)
MCV RBC AUTO: 87 FL (ref 82–98)
MONOCYTES # BLD AUTO: 0.5 K/UL (ref 0.3–1)
MONOCYTES NFR BLD: 9.1 % (ref 4–15)
NEUTROPHILS # BLD AUTO: 1.4 K/UL (ref 1.8–7.7)
NEUTROPHILS NFR BLD: 26.2 % (ref 38–73)
NRBC BLD-RTO: 0 /100 WBC
PLATELET # BLD AUTO: 279 K/UL (ref 150–350)
PMV BLD AUTO: 12.3 FL (ref 9.2–12.9)
POTASSIUM SERPL-SCNC: 4.5 MMOL/L (ref 3.5–5.1)
PROT SERPL-MCNC: 7.5 G/DL (ref 6–8.4)
RBC # BLD AUTO: 5.5 M/UL (ref 4.6–6.2)
SODIUM SERPL-SCNC: 140 MMOL/L (ref 136–145)
WBC # BLD AUTO: 5.15 K/UL (ref 3.9–12.7)

## 2019-04-08 PROCEDURE — 84153 ASSAY OF PSA TOTAL: CPT

## 2019-04-08 PROCEDURE — 3008F BODY MASS INDEX DOCD: CPT | Mod: CPTII,S$GLB,, | Performed by: NURSE PRACTITIONER

## 2019-04-08 PROCEDURE — 99214 OFFICE O/P EST MOD 30 MIN: CPT | Mod: S$GLB,,, | Performed by: NURSE PRACTITIONER

## 2019-04-08 PROCEDURE — 99214 PR OFFICE/OUTPT VISIT, EST, LEVL IV, 30-39 MIN: ICD-10-PCS | Mod: S$GLB,,, | Performed by: NURSE PRACTITIONER

## 2019-04-08 PROCEDURE — 3075F PR MOST RECENT SYSTOLIC BLOOD PRESS GE 130-139MM HG: ICD-10-PCS | Mod: CPTII,S$GLB,, | Performed by: NURSE PRACTITIONER

## 2019-04-08 PROCEDURE — 85025 COMPLETE CBC W/AUTO DIFF WBC: CPT

## 2019-04-08 PROCEDURE — 3080F PR MOST RECENT DIASTOLIC BLOOD PRESSURE >= 90 MM HG: ICD-10-PCS | Mod: CPTII,S$GLB,, | Performed by: NURSE PRACTITIONER

## 2019-04-08 PROCEDURE — 3075F SYST BP GE 130 - 139MM HG: CPT | Mod: CPTII,S$GLB,, | Performed by: NURSE PRACTITIONER

## 2019-04-08 PROCEDURE — 99999 PR PBB SHADOW E&M-EST. PATIENT-LVL III: ICD-10-PCS | Mod: PBBFAC,,, | Performed by: NURSE PRACTITIONER

## 2019-04-08 PROCEDURE — 36415 COLL VENOUS BLD VENIPUNCTURE: CPT | Mod: PO

## 2019-04-08 PROCEDURE — 80053 COMPREHEN METABOLIC PANEL: CPT

## 2019-04-08 PROCEDURE — 3008F PR BODY MASS INDEX (BMI) DOCUMENTED: ICD-10-PCS | Mod: CPTII,S$GLB,, | Performed by: NURSE PRACTITIONER

## 2019-04-08 PROCEDURE — 3080F DIAST BP >= 90 MM HG: CPT | Mod: CPTII,S$GLB,, | Performed by: NURSE PRACTITIONER

## 2019-04-08 PROCEDURE — 99999 PR PBB SHADOW E&M-EST. PATIENT-LVL III: CPT | Mod: PBBFAC,,, | Performed by: NURSE PRACTITIONER

## 2019-04-08 RX ORDER — AMLODIPINE BESYLATE 5 MG/1
TABLET ORAL
COMMUNITY
End: 2019-04-08 | Stop reason: CLARIF

## 2019-04-08 RX ORDER — LOSARTAN POTASSIUM 50 MG/1
50 TABLET ORAL 2 TIMES DAILY
Qty: 180 TABLET | Refills: 1 | Status: CANCELLED | OUTPATIENT
Start: 2019-04-08

## 2019-04-08 RX ORDER — LOSARTAN POTASSIUM AND HYDROCHLOROTHIAZIDE 25; 100 MG/1; MG/1
1 TABLET ORAL DAILY
Qty: 90 TABLET | Refills: 3 | Status: SHIPPED | OUTPATIENT
Start: 2019-04-08 | End: 2020-05-19 | Stop reason: SDUPTHER

## 2019-04-08 RX ORDER — AMLODIPINE BESYLATE 5 MG/1
TABLET ORAL
Status: CANCELLED | OUTPATIENT
Start: 2019-04-08

## 2019-04-08 NOTE — PROGRESS NOTES
Subjective:       Patient ID: Ken Lozord Barthelemy is a 54 y.o. male.    Chief Complaint: Medication Refill    Hypertension   This is a chronic problem. The current episode started more than 1 year ago. The problem has been waxing and waning since onset. The problem is uncontrolled. Pertinent negatives include no anxiety, blurred vision, chest pain, headaches, malaise/fatigue, neck pain, orthopnea, palpitations, peripheral edema, PND, shortness of breath or sweats. There are no associated agents to hypertension. Risk factors for coronary artery disease include male gender, obesity, stress, dyslipidemia and family history. Past treatments include diuretics and ACE inhibitors.     Vitals:    04/08/19 1427   BP: (!) 134/90   Pulse: 73   Resp: 18   Temp: 98.1 °F (36.7 °C)     Review of Systems   Constitutional: Negative.  Negative for fatigue, fever and malaise/fatigue.   HENT: Negative.    Eyes: Negative.  Negative for blurred vision.   Respiratory: Negative.  Negative for cough and shortness of breath.    Cardiovascular: Negative.  Negative for chest pain, palpitations, orthopnea and PND.   Gastrointestinal: Negative.  Negative for abdominal pain, diarrhea and nausea.   Endocrine: Negative.    Genitourinary: Negative.  Negative for dysuria and hematuria.   Musculoskeletal: Negative.  Negative for neck pain.   Skin: Negative.  Negative for color change and rash.   Allergic/Immunologic: Negative.    Neurological: Negative.  Negative for numbness and headaches.   Hematological: Negative.    Psychiatric/Behavioral: Negative.        Past Medical History:   Diagnosis Date    Diverticulitis 2005    May 2005 and Nov 2005    Hypertension        Objective:      Physical Exam   Constitutional: He is oriented to person, place, and time. He appears well-developed and well-nourished.   HENT:   Head: Normocephalic and atraumatic.   Right Ear: Hearing, tympanic membrane, external ear and ear canal normal.   Left Ear: Hearing,  tympanic membrane, external ear and ear canal normal.   Nose: Nose normal. No mucosal edema or rhinorrhea. Right sinus exhibits no maxillary sinus tenderness and no frontal sinus tenderness. Left sinus exhibits no maxillary sinus tenderness and no frontal sinus tenderness.   Mouth/Throat: Uvula is midline, oropharynx is clear and moist and mucous membranes are normal.   Eyes: Pupils are equal, round, and reactive to light. Conjunctivae and EOM are normal.   Neck: Normal range of motion. Neck supple.   Cardiovascular: Normal rate, regular rhythm, normal heart sounds and intact distal pulses. Exam reveals no friction rub.   No murmur heard.  Pulmonary/Chest: Effort normal and breath sounds normal. No stridor. No respiratory distress. He has no wheezes.   Abdominal: Soft. Bowel sounds are normal.   Musculoskeletal: Normal range of motion. He exhibits no edema.   Neurological: He is alert and oriented to person, place, and time. No cranial nerve deficit.   Skin: Skin is warm and dry.   Psychiatric: He has a normal mood and affect. His behavior is normal. Judgment and thought content normal.   Nursing note and vitals reviewed.      Assessment:       1. Essential hypertension    2. Screening for prostate cancer        Plan:       Essential hypertension  -     CBC auto differential; Future; Expected date: 04/08/2019  -     Comprehensive metabolic panel; Future; Expected date: 04/08/2019  -     losartan-hydrochlorothiazide 100-25 mg (HYZAAR) 100-25 mg per tablet; Take 1 tablet by mouth once daily.  Dispense: 90 tablet; Refill: 3  Increase meds since BP check have been higher   Will get labs today also     Screening for prostate cancer  -     PSA, Screening; Future; Expected date: 04/08/2019          FU with Dr link yearly

## 2019-04-22 ENCOUNTER — CLINICAL SUPPORT (OUTPATIENT)
Dept: FAMILY MEDICINE | Facility: CLINIC | Age: 54
End: 2019-04-22
Payer: COMMERCIAL

## 2019-04-22 VITALS — OXYGEN SATURATION: 98 % | DIASTOLIC BLOOD PRESSURE: 76 MMHG | SYSTOLIC BLOOD PRESSURE: 118 MMHG | HEART RATE: 59 BPM

## 2019-04-22 DIAGNOSIS — I10 ESSENTIAL HYPERTENSION: Primary | ICD-10-CM

## 2019-04-22 PROCEDURE — 99999 PR PBB SHADOW E&M-EST. PATIENT-LVL II: CPT | Mod: PBBFAC,,,

## 2019-04-22 PROCEDURE — 99499 NO LOS: ICD-10-PCS | Mod: S$GLB,,, | Performed by: INTERNAL MEDICINE

## 2019-04-22 PROCEDURE — 99999 PR PBB SHADOW E&M-EST. PATIENT-LVL II: ICD-10-PCS | Mod: PBBFAC,,,

## 2019-04-22 PROCEDURE — 99499 UNLISTED E&M SERVICE: CPT | Mod: S$GLB,,, | Performed by: INTERNAL MEDICINE

## 2019-04-22 NOTE — PROGRESS NOTES
.  Ken Lozord Barthelemy 54 y.o. male is here today for Blood Pressure check.   History of HTN yes.    Review of patient's allergies indicates:  No Known Allergies  Creatinine   Date Value Ref Range Status   04/08/2019 1.1 0.5 - 1.4 mg/dL Final     Sodium   Date Value Ref Range Status   04/08/2019 140 136 - 145 mmol/L Final     Potassium   Date Value Ref Range Status   04/08/2019 4.5 3.5 - 5.1 mmol/L Final   ]  Patient verifies taking blood pressure medications on a regular basis at the same time of the day.     Current Outpatient Medications:     losartan-hydrochlorothiazide 100-25 mg (HYZAAR) 100-25 mg per tablet, Take 1 tablet by mouth once daily., Disp: 90 tablet, Rfl: 3  Does patient have record of home blood pressure readings no. Last dose of blood pressure medication was taken at this morning around 9am.  Patient is asymptomatic.   Complains of no complaint.    BP: 126/86 , Pulse: (!) 59 .    Blood pressure reading after 15 minutes was 118/76, Pulse 59.  Dr. Rene notified.

## 2020-05-05 ENCOUNTER — PATIENT MESSAGE (OUTPATIENT)
Dept: ADMINISTRATIVE | Facility: HOSPITAL | Age: 55
End: 2020-05-05

## 2020-05-19 DIAGNOSIS — I10 ESSENTIAL HYPERTENSION: ICD-10-CM

## 2020-05-19 NOTE — TELEPHONE ENCOUNTER
----- Message from Nancy Templeton sent at 5/19/2020  1:51 PM CDT -----  Contact: Patient  Type:  RX Refill Request    Who Called:  Patient  Refill or New Rx:  Refill  RX Name and Strength:  losartan-hydrochlorothiazide 100-25 mg (HYZAAR) 100-25 mg per tablet  How is the patient currently taking it? (ex. 1XDay):  1XDay  Is this a 30 day or 90 day RX:  90 if possible  Preferred Pharmacy with phone number:  [facility] - [address] [facility] [facility] [phone] (Phone)   Local or Mail Order:  Local  Ordering Provider:    Best Call Back Number:  [phone]  Additional Information:

## 2020-05-19 NOTE — TELEPHONE ENCOUNTER
No new care gaps identified.  Powered by [email or web address]. Reference number: [id number]. 5/19/2020 1:55:05 PM CDT

## 2020-05-20 RX ORDER — LOSARTAN POTASSIUM AND HYDROCHLOROTHIAZIDE 25; 100 MG/1; MG/1
1 TABLET ORAL DAILY
Qty: 90 TABLET | Refills: 0 | Status: SHIPPED | OUTPATIENT
Start: 2020-05-20 | End: 2020-09-04 | Stop reason: SDUPTHER

## 2020-05-20 NOTE — PROGRESS NOTES
Refill Routing Note    Medication(s) are not appropriate for processing by [facility]:       Patient has not been seen in over 15 months by PCP and Medication not previously prescribed by PCP         Will follow up with your staff to schedule appointment and labs after your decision.    Medication-related problems identified:   Requires labs  Requires appointment  Medication Therapy Plan: PER EPIC DATA 17% ADHERENCE; UNCLEAR IF PT. FOLLOW WITH PCP(LOV-2/2018); MEDICATION NOT PREVIOUSLY PRESCRIBED BY PCP; NEEDS APPT(ANNUAL); NTBO(CMP, LIPID) PER WOG, DEFER TO YOU         Automatic Epic Protocol Generated Data:    Requested Prescriptions   Pending Prescriptions Disp Refills    losartan-hydrochlorothiazide 100-25 mg (HYZAAR) 100-25 mg per tablet 90 tablet 0     Sig: Take 1 tablet by mouth once daily.       Cardiovascular: ARB + Diuretic Combos Failed - 5/19/2020  1:54 PM        Failed - Last BP in normal range within 360 days.     BP Readings from Last 3 Encounters:   04/22/19 118/76   04/08/19 (!) 134/90   03/13/18 (!) 122/91              Failed - Office visit in past 12 months or future 90 days.     Recent Outpatient Visits            1 year ago Essential hypertension    [facility] Vickie Majano NP    2 years ago Chronic pain of left knee    [facility] Physical Med/Rehab Abisai Bond MD    2 years ago Routine physical examination    [facility] Ernesto Rene MD    2 years ago Hypertension, unspecified type    [facility] Vickie Majano NP    4 years ago Abnormal endocrine laboratory test finding    [facility] Endocrinology Sg Bragg DO                    Failed - K in normal range and within 180 days     Potassium   Date Value Ref Range Status   04/08/2019 4.5 3.5 - 5.1 mmol/L Final   02/08/2018 4.4 3.5 - 5.1 mmol/L Final   10/18/2017 4.4 3.5 - 5.1 mmol/L Final              Failed - Na is between 130 and 148 and within 180  days     Sodium   Date Value Ref Range Status   04/08/2019 140 136 - 145 mmol/L Final   02/08/2018 140 136 - 145 mmol/L Final   10/18/2017 142 136 - 145 mmol/L Final              Failed - Cr is 1.3 or below and within 180 days     Creatinine   Date Value Ref Range Status   04/08/2019 1.1 0.5 - 1.4 mg/dL Final   02/08/2018 1.1 0.5 - 1.4 mg/dL Final   10/18/2017 1.1 0.5 - 1.4 mg/dL Final              Failed - Ca in normal range and within 360 days     Calcium   Date Value Ref Range Status   04/08/2019 9.9 8.7 - 10.5 mg/dL Final   02/08/2018 9.2 8.7 - 10.5 mg/dL Final   10/18/2017 9.3 8.7 - 10.5 mg/dL Final              Failed - eGFR within 180 days     eGFR if non    Date Value Ref Range Status   04/08/2019 >60.0 >60 mL/min/1.73 m^2 Final     Comment:     Calculation used to obtain the estimated glomerular filtration  rate (eGFR) is the CKD-EPI equation.      02/08/2018 >60.0 >60 mL/min/1.73 m^2 Final     Comment:     Calculation used to obtain the estimated glomerular filtration  rate (eGFR) is the CKD-EPI equation.      10/18/2017 >60.0 >60 mL/min/1.73 m^2 Final     Comment:     Calculation used to obtain the estimated glomerular filtration  rate (eGFR) is the CKD-EPI equation. Since race is unknown   in our information system, the eGFR values for   -American and Non--American patients are given   for each creatinine result.       eGFR if    Date Value Ref Range Status   04/08/2019 >60.0 >60 mL/min/1.73 m^2 Final   02/08/2018 >60.0 >60 mL/min/1.73 m^2 Final   10/18/2017 >60.0 >60 mL/min/1.73 m^2 Final              Passed - Patient is at least 18 years old        Powered by [employer] - 5/19/2020  1:54 PM        This is a duplicate request of medication requested 2020-05-19. Check to see if the patient is requesting a refill from a new pharmacy.           Appointments  past 12m or future 3m with PCP    Date Provider   Last Visit   2/8/2018 Ernesto Rene MD   Next Visit    Visit date not found Ernesto Rene MD   ED visits in past 90 days: 0     Note composed:3:18 PM 05/20/2020

## 2020-05-21 NOTE — TELEPHONE ENCOUNTER
Needs an appointment. Needs appointment with me in my next available regular appointment time slot (do not use any hold spots).  Patient about to become new patient to me as not seen in over 2 years.

## 2020-08-26 ENCOUNTER — TELEPHONE (OUTPATIENT)
Dept: FAMILY MEDICINE | Facility: CLINIC | Age: 55
End: 2020-08-26

## 2020-09-04 ENCOUNTER — OFFICE VISIT (OUTPATIENT)
Dept: FAMILY MEDICINE | Facility: CLINIC | Age: 55
End: 2020-09-04
Payer: COMMERCIAL

## 2020-09-04 ENCOUNTER — LAB VISIT (OUTPATIENT)
Dept: LAB | Facility: HOSPITAL | Age: 55
End: 2020-09-04
Attending: INTERNAL MEDICINE
Payer: COMMERCIAL

## 2020-09-04 VITALS
WEIGHT: 237.44 LBS | DIASTOLIC BLOOD PRESSURE: 82 MMHG | HEIGHT: 70 IN | HEART RATE: 64 BPM | BODY MASS INDEX: 33.99 KG/M2 | OXYGEN SATURATION: 98 % | TEMPERATURE: 98 F | SYSTOLIC BLOOD PRESSURE: 122 MMHG

## 2020-09-04 DIAGNOSIS — Z00.00 PREVENTATIVE HEALTH CARE: Primary | ICD-10-CM

## 2020-09-04 DIAGNOSIS — E66.09 CLASS 1 OBESITY DUE TO EXCESS CALORIES WITH SERIOUS COMORBIDITY AND BODY MASS INDEX (BMI) OF 34.0 TO 34.9 IN ADULT: ICD-10-CM

## 2020-09-04 DIAGNOSIS — I10 ESSENTIAL HYPERTENSION: ICD-10-CM

## 2020-09-04 DIAGNOSIS — R68.82 LOW LIBIDO: ICD-10-CM

## 2020-09-04 DIAGNOSIS — Z00.00 PREVENTATIVE HEALTH CARE: ICD-10-CM

## 2020-09-04 LAB
ALBUMIN SERPL BCP-MCNC: 3.9 G/DL (ref 3.5–5.2)
ALP SERPL-CCNC: 67 U/L (ref 55–135)
ALT SERPL W/O P-5'-P-CCNC: 35 U/L (ref 10–44)
ANION GAP SERPL CALC-SCNC: 9 MMOL/L (ref 8–16)
AST SERPL-CCNC: 25 U/L (ref 10–40)
BASOPHILS # BLD AUTO: 0.02 K/UL (ref 0–0.2)
BASOPHILS NFR BLD: 0.5 % (ref 0–1.9)
BILIRUB SERPL-MCNC: 0.8 MG/DL (ref 0.1–1)
BUN SERPL-MCNC: 14 MG/DL (ref 6–20)
CALCIUM SERPL-MCNC: 9.6 MG/DL (ref 8.7–10.5)
CHLORIDE SERPL-SCNC: 106 MMOL/L (ref 95–110)
CHOLEST SERPL-MCNC: 195 MG/DL (ref 120–199)
CHOLEST/HDLC SERPL: 4.3 {RATIO} (ref 2–5)
CO2 SERPL-SCNC: 27 MMOL/L (ref 23–29)
CREAT SERPL-MCNC: 1.1 MG/DL (ref 0.5–1.4)
DIFFERENTIAL METHOD: ABNORMAL
EOSINOPHIL # BLD AUTO: 0.2 K/UL (ref 0–0.5)
EOSINOPHIL NFR BLD: 3.9 % (ref 0–8)
ERYTHROCYTE [DISTWIDTH] IN BLOOD BY AUTOMATED COUNT: 14.6 % (ref 11.5–14.5)
EST. GFR  (AFRICAN AMERICAN): >60 ML/MIN/1.73 M^2
EST. GFR  (NON AFRICAN AMERICAN): >60 ML/MIN/1.73 M^2
GLUCOSE SERPL-MCNC: 112 MG/DL (ref 70–110)
HCT VFR BLD AUTO: 48.2 % (ref 40–54)
HDLC SERPL-MCNC: 45 MG/DL (ref 40–75)
HDLC SERPL: 23.1 % (ref 20–50)
HGB BLD-MCNC: 14.9 G/DL (ref 14–18)
IMM GRANULOCYTES # BLD AUTO: 0 K/UL (ref 0–0.04)
IMM GRANULOCYTES NFR BLD AUTO: 0 % (ref 0–0.5)
LDLC SERPL CALC-MCNC: 112.8 MG/DL (ref 63–159)
LYMPHOCYTES # BLD AUTO: 2.2 K/UL (ref 1–4.8)
LYMPHOCYTES NFR BLD: 55.8 % (ref 18–48)
MCH RBC QN AUTO: 27.4 PG (ref 27–31)
MCHC RBC AUTO-ENTMCNC: 30.9 G/DL (ref 32–36)
MCV RBC AUTO: 89 FL (ref 82–98)
MONOCYTES # BLD AUTO: 0.4 K/UL (ref 0.3–1)
MONOCYTES NFR BLD: 9.3 % (ref 4–15)
NEUTROPHILS # BLD AUTO: 1.2 K/UL (ref 1.8–7.7)
NEUTROPHILS NFR BLD: 30.5 % (ref 38–73)
NONHDLC SERPL-MCNC: 150 MG/DL
NRBC BLD-RTO: 0 /100 WBC
PLATELET # BLD AUTO: 254 K/UL (ref 150–350)
PMV BLD AUTO: 13.4 FL (ref 9.2–12.9)
POTASSIUM SERPL-SCNC: 4.7 MMOL/L (ref 3.5–5.1)
PROT SERPL-MCNC: 7.3 G/DL (ref 6–8.4)
RBC # BLD AUTO: 5.43 M/UL (ref 4.6–6.2)
SODIUM SERPL-SCNC: 142 MMOL/L (ref 136–145)
TRIGL SERPL-MCNC: 186 MG/DL (ref 30–150)
TSH SERPL DL<=0.005 MIU/L-ACNC: 1.15 UIU/ML (ref 0.4–4)
WBC # BLD AUTO: 3.87 K/UL (ref 3.9–12.7)

## 2020-09-04 PROCEDURE — 86703 HIV-1/HIV-2 1 RESULT ANTBDY: CPT

## 2020-09-04 PROCEDURE — 36415 COLL VENOUS BLD VENIPUNCTURE: CPT | Mod: PO

## 2020-09-04 PROCEDURE — 3079F DIAST BP 80-89 MM HG: CPT | Mod: CPTII,S$GLB,, | Performed by: NURSE PRACTITIONER

## 2020-09-04 PROCEDURE — 3074F SYST BP LT 130 MM HG: CPT | Mod: CPTII,S$GLB,, | Performed by: NURSE PRACTITIONER

## 2020-09-04 PROCEDURE — 85025 COMPLETE CBC W/AUTO DIFF WBC: CPT

## 2020-09-04 PROCEDURE — 3008F BODY MASS INDEX DOCD: CPT | Mod: CPTII,S$GLB,, | Performed by: NURSE PRACTITIONER

## 2020-09-04 PROCEDURE — 3074F PR MOST RECENT SYSTOLIC BLOOD PRESSURE < 130 MM HG: ICD-10-PCS | Mod: CPTII,S$GLB,, | Performed by: NURSE PRACTITIONER

## 2020-09-04 PROCEDURE — 3008F PR BODY MASS INDEX (BMI) DOCUMENTED: ICD-10-PCS | Mod: CPTII,S$GLB,, | Performed by: NURSE PRACTITIONER

## 2020-09-04 PROCEDURE — 99396 PREV VISIT EST AGE 40-64: CPT | Mod: S$GLB,,, | Performed by: NURSE PRACTITIONER

## 2020-09-04 PROCEDURE — 99999 PR PBB SHADOW E&M-EST. PATIENT-LVL III: CPT | Mod: PBBFAC,,, | Performed by: NURSE PRACTITIONER

## 2020-09-04 PROCEDURE — 84443 ASSAY THYROID STIM HORMONE: CPT

## 2020-09-04 PROCEDURE — 3079F PR MOST RECENT DIASTOLIC BLOOD PRESSURE 80-89 MM HG: ICD-10-PCS | Mod: CPTII,S$GLB,, | Performed by: NURSE PRACTITIONER

## 2020-09-04 PROCEDURE — 99396 PR PREVENTIVE VISIT,EST,40-64: ICD-10-PCS | Mod: S$GLB,,, | Performed by: NURSE PRACTITIONER

## 2020-09-04 PROCEDURE — 80061 LIPID PANEL: CPT

## 2020-09-04 PROCEDURE — 84403 ASSAY OF TOTAL TESTOSTERONE: CPT

## 2020-09-04 PROCEDURE — 80053 COMPREHEN METABOLIC PANEL: CPT

## 2020-09-04 PROCEDURE — 84153 ASSAY OF PSA TOTAL: CPT

## 2020-09-04 PROCEDURE — 99999 PR PBB SHADOW E&M-EST. PATIENT-LVL III: ICD-10-PCS | Mod: PBBFAC,,, | Performed by: NURSE PRACTITIONER

## 2020-09-04 RX ORDER — LOSARTAN POTASSIUM AND HYDROCHLOROTHIAZIDE 25; 100 MG/1; MG/1
1 TABLET ORAL DAILY
Qty: 90 TABLET | Refills: 3 | Status: SHIPPED | OUTPATIENT
Start: 2020-09-04 | End: 2022-05-19 | Stop reason: SDUPTHER

## 2020-09-04 RX ORDER — AMLODIPINE BESYLATE 5 MG/1
TABLET ORAL
COMMUNITY
End: 2020-09-04

## 2020-09-04 NOTE — PROGRESS NOTES
Subjective:       Patient ID: Ken Lozord Barthelemy is a 55 y.o. male.    Chief Complaint: Routine medical exam    HPI   Patient presents for annual exam    HTN: controlled   HLD: due for recheck. LDL goal <130 (HTN, age)    Colonoscopy 2015--3 polyps--path hyperplastic changes, due for repeat 6-7 years (2021/2022)    Reports fatigue/lack of energy and low libido. No problems with erection or ejaculation   Vitals:    09/04/20 0909   BP: 122/82   Pulse: 64   Temp: 98.4 °F (36.9 °C)     Review of Systems   Constitutional: Positive for fatigue. Negative for diaphoresis and fever.   HENT: Negative for facial swelling and trouble swallowing.    Eyes: Negative for discharge and redness.   Respiratory: Negative for cough and shortness of breath.    Cardiovascular: Negative for chest pain and palpitations.   Gastrointestinal: Negative for abdominal pain, constipation and diarrhea.   Genitourinary: Negative for difficulty urinating, hematuria and urgency.   Musculoskeletal: Negative for gait problem.   Skin: Negative for pallor and rash.   Neurological: Negative for facial asymmetry and speech difficulty.   Psychiatric/Behavioral: Negative for confusion. The patient is not nervous/anxious.        Past Medical History:   Diagnosis Date    Diverticulitis 2005    May 2005 and Nov 2005    Hypertension      Objective:      Physical Exam  Vitals signs and nursing note reviewed.   Constitutional:       General: He is not in acute distress.     Appearance: He is not diaphoretic.   HENT:      Head: Normocephalic.      Right Ear: Hearing normal.      Left Ear: Hearing normal.      Nose: Nose normal.   Eyes:      General: Lids are normal.      Conjunctiva/sclera: Conjunctivae normal.   Neck:      Vascular: No JVD.      Trachea: No tracheal deviation.   Cardiovascular:      Rate and Rhythm: Normal rate.      Heart sounds: Normal heart sounds.   Pulmonary:      Effort: Pulmonary effort is normal.      Breath sounds: Normal breath  sounds.   Abdominal:      General: There is no distension.   Musculoskeletal:         General: No deformity.      Right lower leg: No edema.      Left lower leg: No edema.   Skin:     Coloration: Skin is not pale.   Neurological:      Mental Status: He is alert and oriented to person, place, and time.   Psychiatric:         Speech: Speech normal.         Behavior: Behavior normal.         Thought Content: Thought content normal.         Judgment: Judgment normal.         Assessment:       1. Preventative health care    2. Essential hypertension    3. Low libido    4. Class 1 obesity due to excess calories with serious comorbidity and body mass index (BMI) of 34.0 to 34.9 in adult        Plan:       Preventative health care  -     CBC auto differential; Future; Expected date: 09/04/2020  -     Comprehensive metabolic panel; Future; Expected date: 09/04/2020  -     Lipid Panel; Future; Expected date: 09/04/2020  -     PSA, Screening; Future; Expected date: 09/04/2020  -     TSH; Future; Expected date: 09/04/2020  -     HIV 1/2 Ag/Ab (4th Gen); Future; Expected date: 09/04/2020    Essential hypertension  -     losartan-hydrochlorothiazide 100-25 mg (HYZAAR) 100-25 mg per tablet; Take 1 tablet by mouth once daily.  Dispense: 90 tablet; Refill: 3    Low libido  -     Testosterone; Future; Expected date: 09/04/2020    Class 1 obesity due to excess calories with serious comorbidity and body mass index (BMI) of 34.0 to 34.9 in adult        recommend small, frequent low carb meals  Labs today    Follow up in about 1 year (around 9/4/2021).    Medication List with Changes/Refills   Changed and/or Refilled Medications    Modified Medication Previous Medication    LOSARTAN-HYDROCHLOROTHIAZIDE 100-25 MG (HYZAAR) 100-25 MG PER TABLET losartan-hydrochlorothiazide 100-25 mg (HYZAAR) 100-25 mg per tablet       Take 1 tablet by mouth once daily.    Take 1 tablet by mouth once daily.   Discontinued Medications    AMLODIPINE (NORVASC)  5 MG TABLET    amlodipine 5 mg tablet

## 2020-09-05 LAB
COMPLEXED PSA SERPL-MCNC: 1.3 NG/ML (ref 0–4)
TESTOST SERPL-MCNC: 260 NG/DL (ref 304–1227)

## 2020-09-07 LAB — HIV 1+2 AB+HIV1 P24 AG SERPL QL IA: NEGATIVE

## 2020-09-08 DIAGNOSIS — D72.820 LYMPHOCYTOSIS: Primary | ICD-10-CM

## 2020-10-15 ENCOUNTER — PATIENT OUTREACH (OUTPATIENT)
Dept: ADMINISTRATIVE | Facility: OTHER | Age: 55
End: 2020-10-15

## 2020-10-15 DIAGNOSIS — R79.89 LOW TESTOSTERONE: Primary | ICD-10-CM

## 2020-10-16 ENCOUNTER — LAB VISIT (OUTPATIENT)
Dept: LAB | Facility: HOSPITAL | Age: 55
End: 2020-10-16
Attending: NURSE PRACTITIONER
Payer: COMMERCIAL

## 2020-10-16 DIAGNOSIS — D72.820 LYMPHOCYTOSIS: ICD-10-CM

## 2020-10-16 LAB
BASOPHILS # BLD AUTO: 0.03 K/UL (ref 0–0.2)
BASOPHILS NFR BLD: 0.5 % (ref 0–1.9)
DIFFERENTIAL METHOD: ABNORMAL
EOSINOPHIL # BLD AUTO: 0.2 K/UL (ref 0–0.5)
EOSINOPHIL NFR BLD: 2.8 % (ref 0–8)
ERYTHROCYTE [DISTWIDTH] IN BLOOD BY AUTOMATED COUNT: 14.5 % (ref 11.5–14.5)
HCT VFR BLD AUTO: 47 % (ref 40–54)
HGB BLD-MCNC: 14.7 G/DL (ref 14–18)
IMM GRANULOCYTES # BLD AUTO: 0.01 K/UL (ref 0–0.04)
IMM GRANULOCYTES NFR BLD AUTO: 0.2 % (ref 0–0.5)
LYMPHOCYTES # BLD AUTO: 3 K/UL (ref 1–4.8)
LYMPHOCYTES NFR BLD: 51.1 % (ref 18–48)
MCH RBC QN AUTO: 27.6 PG (ref 27–31)
MCHC RBC AUTO-ENTMCNC: 31.3 G/DL (ref 32–36)
MCV RBC AUTO: 88 FL (ref 82–98)
MONOCYTES # BLD AUTO: 0.5 K/UL (ref 0.3–1)
MONOCYTES NFR BLD: 9.3 % (ref 4–15)
NEUTROPHILS # BLD AUTO: 2.1 K/UL (ref 1.8–7.7)
NEUTROPHILS NFR BLD: 36.1 % (ref 38–73)
NRBC BLD-RTO: 0 /100 WBC
PATH REV BLD -IMP: NORMAL
PLATELET # BLD AUTO: 264 K/UL (ref 150–350)
PMV BLD AUTO: 13.2 FL (ref 9.2–12.9)
RBC # BLD AUTO: 5.32 M/UL (ref 4.6–6.2)
WBC # BLD AUTO: 5.81 K/UL (ref 3.9–12.7)

## 2020-10-16 PROCEDURE — 85060 BLOOD SMEAR INTERPRETATION: CPT | Mod: ,,, | Performed by: PATHOLOGY

## 2020-10-16 PROCEDURE — 85060 PATHOLOGIST REVIEW: ICD-10-PCS | Mod: ,,, | Performed by: PATHOLOGY

## 2020-10-16 PROCEDURE — 36415 COLL VENOUS BLD VENIPUNCTURE: CPT | Mod: PO

## 2020-10-16 PROCEDURE — 85025 COMPLETE CBC W/AUTO DIFF WBC: CPT

## 2020-10-16 NOTE — PROGRESS NOTES
Health Maintenance Due   Topic Date Due    Shingles Vaccine (1 of 2) 03/20/2013    Influenza Vaccine (1) 08/01/2020     Updates were requested from care everywhere.  Chart was reviewed for overdue Proactive [facility] ([facility]) topics (CRS, Breast Cancer Screening, Eye exam)  Health Maintenance has been updated.  LINKS immunization registry triggered.  Immunizations were reconciled.

## 2020-10-19 ENCOUNTER — OFFICE VISIT (OUTPATIENT)
Dept: ENDOCRINOLOGY | Facility: CLINIC | Age: 55
End: 2020-10-19
Payer: COMMERCIAL

## 2020-10-19 VITALS
SYSTOLIC BLOOD PRESSURE: 124 MMHG | BODY MASS INDEX: 34.09 KG/M2 | DIASTOLIC BLOOD PRESSURE: 80 MMHG | HEIGHT: 70 IN | WEIGHT: 238.13 LBS | HEART RATE: 63 BPM | OXYGEN SATURATION: 98 %

## 2020-10-19 DIAGNOSIS — E66.09 CLASS 1 OBESITY DUE TO EXCESS CALORIES WITHOUT SERIOUS COMORBIDITY WITH BODY MASS INDEX (BMI) OF 34.0 TO 34.9 IN ADULT: ICD-10-CM

## 2020-10-19 DIAGNOSIS — R68.89 ABNORMAL ENDOCRINE LABORATORY TEST FINDING: ICD-10-CM

## 2020-10-19 DIAGNOSIS — N52.8 OTHER MALE ERECTILE DYSFUNCTION: ICD-10-CM

## 2020-10-19 DIAGNOSIS — R06.83 SNORING: ICD-10-CM

## 2020-10-19 PROBLEM — E66.811 CLASS 1 OBESITY DUE TO EXCESS CALORIES WITHOUT SERIOUS COMORBIDITY WITH BODY MASS INDEX (BMI) OF 34.0 TO 34.9 IN ADULT: Status: ACTIVE | Noted: 2020-10-19

## 2020-10-19 PROCEDURE — 99203 PR OFFICE/OUTPT VISIT, NEW, LEVL III, 30-44 MIN: ICD-10-PCS | Mod: S$GLB,,, | Performed by: INTERNAL MEDICINE

## 2020-10-19 PROCEDURE — 3074F SYST BP LT 130 MM HG: CPT | Mod: CPTII,S$GLB,, | Performed by: INTERNAL MEDICINE

## 2020-10-19 PROCEDURE — 3074F PR MOST RECENT SYSTOLIC BLOOD PRESSURE < 130 MM HG: ICD-10-PCS | Mod: CPTII,S$GLB,, | Performed by: INTERNAL MEDICINE

## 2020-10-19 PROCEDURE — 3079F DIAST BP 80-89 MM HG: CPT | Mod: CPTII,S$GLB,, | Performed by: INTERNAL MEDICINE

## 2020-10-19 PROCEDURE — 99999 PR PBB SHADOW E&M-EST. PATIENT-LVL III: CPT | Mod: PBBFAC,,, | Performed by: INTERNAL MEDICINE

## 2020-10-19 PROCEDURE — 99203 OFFICE O/P NEW LOW 30 MIN: CPT | Mod: S$GLB,,, | Performed by: INTERNAL MEDICINE

## 2020-10-19 PROCEDURE — 3079F PR MOST RECENT DIASTOLIC BLOOD PRESSURE 80-89 MM HG: ICD-10-PCS | Mod: CPTII,S$GLB,, | Performed by: INTERNAL MEDICINE

## 2020-10-19 PROCEDURE — 3008F BODY MASS INDEX DOCD: CPT | Mod: CPTII,S$GLB,, | Performed by: INTERNAL MEDICINE

## 2020-10-19 PROCEDURE — 99999 PR PBB SHADOW E&M-EST. PATIENT-LVL III: ICD-10-PCS | Mod: PBBFAC,,, | Performed by: INTERNAL MEDICINE

## 2020-10-19 PROCEDURE — 3008F PR BODY MASS INDEX (BMI) DOCUMENTED: ICD-10-PCS | Mod: CPTII,S$GLB,, | Performed by: INTERNAL MEDICINE

## 2020-10-19 NOTE — ASSESSMENT & PLAN NOTE
Patient found to have low a.m. test 0 x 1.  Has history of hypogonadism and previously on testosterone therapy.  Patient notes no clinical improvement while taking therapy previously.  Symptoms include mild fatigue but no other significant symptoms hypogonadism. Given prior hx of no improvement with therapy, will not resume tx. Continue to monitor. Check baseline BMD test.

## 2020-10-19 NOTE — LETTER
October 19, 2020      Rosalie Ferrell, TERRY  [address] [facility] [address] [place] [address]           [place] [facility] End[facility]  [address] [facility] [address] [place] [address]  Phone: [phone]  Fax: [phone]          Patient: Ken Lozord Barthelemy   MR Number: [id number]   YOB: 1965   Date of Visit: 10/19/2020       Dear Rosalie Ferrell:    Thank you for referring Ken Barthelemy to me for evaluation. Attached you will find relevant portions of my assessment and plan of care.    If you have questions, please do not hesitate to call me. I look forward to following Ken Barthelemy along with you.    Sincerely,    Juliette L. Sandifer Kum-Nji, MD    Enclosure  CC:  No Recipients    If you would like to receive this communication electronically, please contact [email or web address] or [phone] to request more information on [email or web address] Link access.    For providers and/or their staff who would like to refer a patient to [facility], please contact us through our one-stop-shop provider referral line, [facility], at [phone].    If you feel you have received this communication in error or would no longer like to receive these types of communications, please e-mail [email or web address]

## 2020-10-19 NOTE — PROGRESS NOTES
Subjective:    Patient ID:  Ken Lozord Barthelemy is a 55 y.o. male.    Chief Complaint:  abnormal endocrine test      Pt presents to establish care for low testosterone.     Seen by Dr. Bragg in the past. Had hypogonadism previously managed by Dr. Gomez.    States that was found to have a low testosterone after recent labs by PCP. Has h/o of hypogonadism and previously took testosterone therapy years ago but has been off for years. Normal libido. Has some ED.  He does snore. No galactorrhea. No head trauma. Notes some days he feels fatigued which prompted most recent lab check. Does not recall any clinical difference previously while taking the medication than when off. Works 12 hour days every other week for [employer]. Had sleep study years ago. Notes he was never prescribed a CPAP. Thinks he still snores. He is concerned about potential SEs of testo injections (increased prostate cancer risk). 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 No loss of body hair. No hx of depression.       Review of Systems   Constitutional: Positive for fatigue. Negative for unexpected weight change.   Eyes: Negative for visual disturbance.   Respiratory: Negative for shortness of breath.    Cardiovascular: Negative for chest pain.   Gastrointestinal: Negative for abdominal pain.   Endocrine: Positive for polyuria.   Genitourinary:        +ve nocturia 2x per night. Worse while on diuretic.   Musculoskeletal: Negative for myalgias.   Skin: Negative for wound.   Neurological: Negative for weakness and headaches.   Hematological: Does not bruise/bleed easily.   Psychiatric/Behavioral: Negative for sleep disturbance.        Past Medical History:   Diagnosis Date    Diverticulitis 2005    May 2005 and 2005    Hypertension       Social History     Tobacco Use    Smoking status: Former Smoker     Types: Cigars     Quit date: 2012     Years since quittin.0    Smokeless tobacco:  "Never Used   Substance Use Topics    Alcohol use: Yes     Alcohol/week: 2.0 standard drinks     Types: 2 Shots of liquor per week     Frequency: 4 or more times a week     Comment: 1-2 glasses/day    Drug use: No     Family History   Problem Relation Age of Onset    Heart disease Father         73    Hypertension Mother       Past Surgical History:   Procedure Laterality Date    APPENDECTOMY  11/2005    COLONOSCOPY  4/26/2005  [facility]    Diverticulosis    COLONOSCOPY N/A 9/18/2015    Procedure: COLONOSCOPY;  Surgeon: Yahir Serrano Jr., MD;  Location: [facility];  Service: Endoscopy;  Laterality: N/A;    MULTIPLE TOOTH EXTRACTIONS      SIGMOIDECTOMY  1/17/2006  [facility]    Laparoscopic sigmoidectomy, for recurrent diverticulitis.          Current Outpatient Medications:     losartan-hydrochlorothiazide 100-25 mg (HYZAAR) 100-25 mg per tablet, Take 1 tablet by mouth once daily., Disp: 90 tablet, Rfl: 3     Review of patient's allergies indicates:  No Known Allergies     Objective:   /80   Pulse 63   Ht 5' 10" (1.778 m)   Wt 108 kg (238 lb 1.6 oz)   SpO2 98%   BMI 34.16 kg/m²   BP Readings from Last 3 Encounters:   10/19/20 124/80   09/04/20 122/82   04/22/19 118/76     Wt Readings from Last 3 Encounters:   10/19/20 1055 108 kg (238 lb 1.6 oz)   09/04/20 0909 107.7 kg (237 lb 7 oz)   04/08/19 1427 112.4 kg (247 lb 12.8 oz)          Physical Exam  Vitals signs reviewed.   Constitutional:       General: He is not in acute distress.     Appearance: Normal appearance. He is well-developed. He is not ill-appearing, toxic-appearing or diaphoretic.   HENT:      Head: Normocephalic and atraumatic.      Nose: Nose normal.   Eyes:      General: No scleral icterus.  Neck:      Trachea: No tracheal deviation.   Cardiovascular:      Rate and Rhythm: Normal rate and regular rhythm.      Heart sounds: Normal heart sounds. No murmur.   Pulmonary:      Effort: Pulmonary effort is normal. No respiratory distress. "      Breath sounds: Normal breath sounds. No wheezing or rales.   Abdominal:      General: Bowel sounds are normal. There is no distension.      Palpations: Abdomen is soft.      Tenderness: There is no abdominal tenderness.      Comments: Obese     Musculoskeletal:         General: No swelling.   Lymphadenopathy:      Cervical: No cervical adenopathy.   Skin:     General: Skin is warm.   Neurological:      Mental Status: He is alert and oriented to person, place, and time.      Cranial Nerves: No cranial nerve deficit.      Motor: No weakness.      Deep Tendon Reflexes: Reflexes normal.   Psychiatric:         Thought Content: Thought content normal.           Lab Results   Component Value Date     09/04/2020    K 4.7 09/04/2020     09/04/2020    CO2 27 09/04/2020    BUN 14 09/04/2020    CREATININE 1.1 09/04/2020     (H) 09/04/2020    MG 2.5 (H) 03/14/2005    AST 25 09/04/2020    ALT 35 09/04/2020    ALBUMIN 3.9 09/04/2020    PROT 7.3 09/04/2020    BILITOT 0.8 09/04/2020    WBC 5.81 10/16/2020    HGB 14.7 10/16/2020    HCT 47.0 10/16/2020    MCV 88 10/16/2020    MCH 27.6 10/16/2020     10/16/2020    MPV 13.2 (H) 10/16/2020    GRAN 2.1 10/16/2020    GRAN 36.1 (L) 10/16/2020    LYMPH 3.0 10/16/2020    LYMPH 51.1 (H) 10/16/2020    CHOL 195 09/04/2020    HDL 45 09/04/2020    LDLCALC 112.8 09/04/2020    TRIG 186 (H) 09/04/2020       Lab Results   Component Value Date    TSH 1.152 09/04/2020        Thyroid Labs Latest Ref Rng & Units 4/8/2019 9/4/2020 10/16/2020   TSH 0.400 - 4.000 uIU/mL - 1.152 -   Sodium 136 - 145 mmol/L 140 142 -   Potassium 3.5 - 5.1 mmol/L 4.5 4.7 -   Chloride 95 - 110 mmol/L 101 106 -   Carbon Dioxide 23 - 29 mmol/L 33(H) 27 -   Glucose 70 - 110 mg/dL 99 112(H) -   Blood Urea Nitrogen 6 - 20 mg/dL 17 14 -   Creatinine 0.5 - 1.4 mg/dL 1.1 1.1 -   Calcium 8.7 - 10.5 mg/dL 9.9 9.6 -   Total Protein 6.0 - 8.4 g/dL 7.5 7.3 -   Albumin 3.5 - 5.2 g/dL 3.9 3.9 -   Total Bilirubin  0.1 - 1.0 mg/dL 0.6 0.8 -   AST 10 - 40 U/L 29 25 -   ALT 10 - 44 U/L 49(H) 35 -   Anion Gap 8 - 16 mmol/L 6(L) 9 -   eGFR (African American) >60 mL/min/1.73 m:2 >60.0 >60.0 -   eGFR (Non-African American) >60 mL/min/1.73 m:2 >60.0 >60.0 -   WBC 3.90 - 12.70 K/uL 5.15 3.87(L) 5.81   RBC 4.60 - 6.20 M/uL 5.50 5.43 5.32   Hemoglobin 14.0 - 18.0 g/dL 14.9 14.9 14.7   Hematocrit 40.0 - 54.0 % 47.7 48.2 47.0   MCV 82 - 98 fL 87 89 88   MCH 27.0 - 31.0 pg 27.1 27.4 27.6   MCHC 32.0 - 36.0 g/dL 31.2(L) 30.9(L) 31.3(L)   RDW 11.5 - 14.5 % 14.1 14.6(H) 14.5   Platelets 150 - 350 K/uL 279 254 264   MPV 9.2 - 12.9 fL 12.3 13.4(H) 13.2(H)   Gran # 1.8 - 7.7 K/uL 1.4(L) 1.2(L) 2.1   Lymph # 1.0 - 4.8 K/uL 3.1 2.2 3.0   Mono # 0.3 - 1.0 K/uL 0.5 0.4 0.5   Eos # 0.0 - 0.5 K/uL 0.2 0.2 0.2   Baso # 0.00 - 0.20 K/uL 0.04 0.02 0.03   Gran % 38.0 - 73.0 % 26.2(L) 30.5(L) 36.1(L)   Lymph % 18.0 - 48.0 % 59.8(H) 55.8(H) 51.1(H)   Mono% 4.0 - 15.0 % 9.1 9.3 9.3   Eos % 0.0 - 8.0 % 3.9 3.9 2.8   Baso % 0.0 - 1.9 % 0.8 0.5 0.5         No results found for: HGBA1C      Assessment and plan:       Problem List Items Addressed This Visit        Renal/    Other male erectile dysfunction    Current Assessment & Plan     Likely exacerbated by HTN meds. F/u for trial of performance meds per PCP.             Endocrine    Class 1 obesity due to excess calories without serious comorbidity with body mass index (BMI) of 34.0 to 34.9 in adult    Current Assessment & Plan     BMI 34. Encouraged healthy low fat low carb diet and increase physical activity              Other    Abnormal endocrine laboratory test finding    Current Assessment & Plan     Patient found to have low a.m. test 0 x 1.  Has history of hypogonadism and previously on testosterone therapy.  Patient notes no clinical improvement while taking therapy previously.  Symptoms include mild fatigue but no other significant symptoms hypogonadism. Given prior hx of no improvement with  therapy, will not resume tx. Continue to monitor. Check baseline BMD test.         Relevant Orders    DXA Bone Density Spine And Hip    Snoring    Current Assessment & Plan     Recommend follow up sleep study.                RTC prn   DEXA scan now

## 2020-10-20 LAB — PATH REV BLD -IMP: NORMAL

## 2021-05-14 ENCOUNTER — OFFICE VISIT (OUTPATIENT)
Dept: ENDOCRINOLOGY | Facility: CLINIC | Age: 56
End: 2021-05-14
Payer: COMMERCIAL

## 2021-05-14 VITALS
WEIGHT: 242 LBS | SYSTOLIC BLOOD PRESSURE: 114 MMHG | TEMPERATURE: 98 F | HEART RATE: 78 BPM | BODY MASS INDEX: 34.72 KG/M2 | DIASTOLIC BLOOD PRESSURE: 88 MMHG

## 2021-05-14 DIAGNOSIS — E66.09 CLASS 1 OBESITY DUE TO EXCESS CALORIES WITHOUT SERIOUS COMORBIDITY WITH BODY MASS INDEX (BMI) OF 34.0 TO 34.9 IN ADULT: ICD-10-CM

## 2021-05-14 DIAGNOSIS — I10 HYPERTENSION, UNSPECIFIED TYPE: ICD-10-CM

## 2021-05-14 DIAGNOSIS — R79.89 LOW TESTOSTERONE: Primary | ICD-10-CM

## 2021-05-14 DIAGNOSIS — N52.8 OTHER MALE ERECTILE DYSFUNCTION: ICD-10-CM

## 2021-05-14 DIAGNOSIS — G47.33 OSA (OBSTRUCTIVE SLEEP APNEA): ICD-10-CM

## 2021-05-14 PROCEDURE — 3008F PR BODY MASS INDEX (BMI) DOCUMENTED: ICD-10-PCS | Mod: CPTII,S$GLB,, | Performed by: HOSPITALIST

## 2021-05-14 PROCEDURE — 3008F BODY MASS INDEX DOCD: CPT | Mod: CPTII,S$GLB,, | Performed by: HOSPITALIST

## 2021-05-14 PROCEDURE — 1126F AMNT PAIN NOTED NONE PRSNT: CPT | Mod: S$GLB,,, | Performed by: HOSPITALIST

## 2021-05-14 PROCEDURE — 99999 PR PBB SHADOW E&M-EST. PATIENT-LVL III: ICD-10-PCS | Mod: PBBFAC,,, | Performed by: HOSPITALIST

## 2021-05-14 PROCEDURE — 99214 PR OFFICE/OUTPT VISIT, EST, LEVL IV, 30-39 MIN: ICD-10-PCS | Mod: S$GLB,,, | Performed by: HOSPITALIST

## 2021-05-14 PROCEDURE — 1126F PR PAIN SEVERITY QUANTIFIED, NO PAIN PRESENT: ICD-10-PCS | Mod: S$GLB,,, | Performed by: HOSPITALIST

## 2021-05-14 PROCEDURE — 99999 PR PBB SHADOW E&M-EST. PATIENT-LVL III: CPT | Mod: PBBFAC,,, | Performed by: HOSPITALIST

## 2021-05-14 PROCEDURE — 99214 OFFICE O/P EST MOD 30 MIN: CPT | Mod: S$GLB,,, | Performed by: HOSPITALIST

## 2021-05-17 ENCOUNTER — PATIENT MESSAGE (OUTPATIENT)
Dept: ENDOCRINOLOGY | Facility: CLINIC | Age: 56
End: 2021-05-17

## 2021-05-24 ENCOUNTER — LAB VISIT (OUTPATIENT)
Dept: LAB | Facility: HOSPITAL | Age: 56
End: 2021-05-24
Attending: HOSPITALIST
Payer: COMMERCIAL

## 2021-05-24 DIAGNOSIS — R79.89 LOW TESTOSTERONE: ICD-10-CM

## 2021-05-24 DIAGNOSIS — E66.09 CLASS 1 OBESITY DUE TO EXCESS CALORIES WITHOUT SERIOUS COMORBIDITY WITH BODY MASS INDEX (BMI) OF 34.0 TO 34.9 IN ADULT: ICD-10-CM

## 2021-05-24 DIAGNOSIS — I10 HYPERTENSION, UNSPECIFIED TYPE: ICD-10-CM

## 2021-05-24 LAB
ALBUMIN SERPL BCP-MCNC: 3.6 G/DL (ref 3.5–5.2)
ALP SERPL-CCNC: 67 U/L (ref 55–135)
ALT SERPL W/O P-5'-P-CCNC: 37 U/L (ref 10–44)
ANION GAP SERPL CALC-SCNC: 7 MMOL/L (ref 8–16)
AST SERPL-CCNC: 23 U/L (ref 10–40)
BASOPHILS # BLD AUTO: 0.03 K/UL (ref 0–0.2)
BASOPHILS NFR BLD: 0.6 % (ref 0–1.9)
BILIRUB SERPL-MCNC: 0.6 MG/DL (ref 0.1–1)
BUN SERPL-MCNC: 14 MG/DL (ref 6–20)
CALCIUM SERPL-MCNC: 9.2 MG/DL (ref 8.7–10.5)
CHLORIDE SERPL-SCNC: 105 MMOL/L (ref 95–110)
CO2 SERPL-SCNC: 27 MMOL/L (ref 23–29)
COMPLEXED PSA SERPL-MCNC: 0.95 NG/ML (ref 0–4)
CREAT SERPL-MCNC: 1.1 MG/DL (ref 0.5–1.4)
DIFFERENTIAL METHOD: ABNORMAL
EOSINOPHIL # BLD AUTO: 0.2 K/UL (ref 0–0.5)
EOSINOPHIL NFR BLD: 4.2 % (ref 0–8)
ERYTHROCYTE [DISTWIDTH] IN BLOOD BY AUTOMATED COUNT: 13.5 % (ref 11.5–14.5)
EST. GFR  (AFRICAN AMERICAN): >60 ML/MIN/1.73 M^2
EST. GFR  (NON AFRICAN AMERICAN): >60 ML/MIN/1.73 M^2
ESTRADIOL SERPL-MCNC: 26 PG/ML (ref 11–44)
FERRITIN SERPL-MCNC: 595 NG/ML (ref 20–300)
FSH SERPL-ACNC: 3.1 MIU/ML (ref 0.95–11.95)
GLUCOSE SERPL-MCNC: 108 MG/DL (ref 70–110)
HCT VFR BLD AUTO: 45.5 % (ref 40–54)
HGB BLD-MCNC: 14.6 G/DL (ref 14–18)
IMM GRANULOCYTES # BLD AUTO: 0.01 K/UL (ref 0–0.04)
IMM GRANULOCYTES NFR BLD AUTO: 0.2 % (ref 0–0.5)
LH SERPL-ACNC: 2.2 MIU/ML (ref 0.6–12.1)
LYMPHOCYTES # BLD AUTO: 2.6 K/UL (ref 1–4.8)
LYMPHOCYTES NFR BLD: 54.6 % (ref 18–48)
MCH RBC QN AUTO: 27.8 PG (ref 27–31)
MCHC RBC AUTO-ENTMCNC: 32.1 G/DL (ref 32–36)
MCV RBC AUTO: 87 FL (ref 82–98)
MONOCYTES # BLD AUTO: 0.5 K/UL (ref 0.3–1)
MONOCYTES NFR BLD: 10.5 % (ref 4–15)
NEUTROPHILS # BLD AUTO: 1.4 K/UL (ref 1.8–7.7)
NEUTROPHILS NFR BLD: 29.9 % (ref 38–73)
NRBC BLD-RTO: 0 /100 WBC
PLATELET # BLD AUTO: 240 K/UL (ref 150–450)
PMV BLD AUTO: 13.3 FL (ref 9.2–12.9)
POTASSIUM SERPL-SCNC: 4.4 MMOL/L (ref 3.5–5.1)
PROT SERPL-MCNC: 6.9 G/DL (ref 6–8.4)
RBC # BLD AUTO: 5.26 M/UL (ref 4.6–6.2)
SODIUM SERPL-SCNC: 139 MMOL/L (ref 136–145)
TSH SERPL DL<=0.005 MIU/L-ACNC: 2.06 UIU/ML (ref 0.4–4)
WBC # BLD AUTO: 4.78 K/UL (ref 3.9–12.7)

## 2021-05-24 PROCEDURE — 83001 ASSAY OF GONADOTROPIN (FSH): CPT | Performed by: HOSPITALIST

## 2021-05-24 PROCEDURE — 84403 ASSAY OF TOTAL TESTOSTERONE: CPT | Performed by: HOSPITALIST

## 2021-05-24 PROCEDURE — 82728 ASSAY OF FERRITIN: CPT | Performed by: HOSPITALIST

## 2021-05-24 PROCEDURE — 82670 ASSAY OF TOTAL ESTRADIOL: CPT | Performed by: HOSPITALIST

## 2021-05-24 PROCEDURE — 84305 ASSAY OF SOMATOMEDIN: CPT | Performed by: HOSPITALIST

## 2021-05-24 PROCEDURE — 36415 COLL VENOUS BLD VENIPUNCTURE: CPT | Mod: PO | Performed by: HOSPITALIST

## 2021-05-24 PROCEDURE — 84153 ASSAY OF PSA TOTAL: CPT | Performed by: HOSPITALIST

## 2021-05-24 PROCEDURE — 84443 ASSAY THYROID STIM HORMONE: CPT | Performed by: HOSPITALIST

## 2021-05-24 PROCEDURE — 83002 ASSAY OF GONADOTROPIN (LH): CPT | Performed by: HOSPITALIST

## 2021-05-24 PROCEDURE — 85025 COMPLETE CBC W/AUTO DIFF WBC: CPT | Performed by: HOSPITALIST

## 2021-05-24 PROCEDURE — 80053 COMPREHEN METABOLIC PANEL: CPT | Performed by: HOSPITALIST

## 2021-05-28 LAB
IGF-I SERPL-MCNC: 54 NG/ML (ref 34–232)
IGF-I Z-SCORE SERPL: -1.65 SD

## 2021-05-30 LAB
ALBUMIN SERPL-MCNC: 4 G/DL (ref 3.6–5.1)
SHBG SERPL-SCNC: 28 NMOL/L (ref 22–77)
TESTOST FREE SERPL-MCNC: 36.5 PG/ML (ref 46–224)
TESTOST SERPL-MCNC: 248 NG/DL (ref 250–1100)
TESTOSTERONE.FREE+WB SERPL-MCNC: 67 NG/DL (ref 110–575)

## 2021-05-31 ENCOUNTER — TELEPHONE (OUTPATIENT)
Dept: ENDOCRINOLOGY | Facility: CLINIC | Age: 56
End: 2021-05-31

## 2022-04-29 DIAGNOSIS — I10 ESSENTIAL HYPERTENSION: ICD-10-CM

## 2022-04-29 RX ORDER — LOSARTAN POTASSIUM AND HYDROCHLOROTHIAZIDE 25; 100 MG/1; MG/1
1 TABLET ORAL DAILY
Qty: 90 TABLET | Refills: 3 | Status: CANCELLED | OUTPATIENT
Start: 2022-04-29 | End: 2023-04-29

## 2022-05-19 ENCOUNTER — LAB VISIT (OUTPATIENT)
Dept: LAB | Facility: HOSPITAL | Age: 57
End: 2022-05-19
Attending: FAMILY MEDICINE
Payer: COMMERCIAL

## 2022-05-19 ENCOUNTER — OFFICE VISIT (OUTPATIENT)
Dept: FAMILY MEDICINE | Facility: CLINIC | Age: 57
End: 2022-05-19
Payer: COMMERCIAL

## 2022-05-19 VITALS
HEART RATE: 65 BPM | OXYGEN SATURATION: 97 % | WEIGHT: 224.88 LBS | DIASTOLIC BLOOD PRESSURE: 80 MMHG | SYSTOLIC BLOOD PRESSURE: 124 MMHG | BODY MASS INDEX: 32.19 KG/M2 | RESPIRATION RATE: 16 BRPM | TEMPERATURE: 98 F | HEIGHT: 70 IN

## 2022-05-19 DIAGNOSIS — Z13.6 ENCOUNTER FOR LIPID SCREENING FOR CARDIOVASCULAR DISEASE: ICD-10-CM

## 2022-05-19 DIAGNOSIS — I10 ESSENTIAL HYPERTENSION: ICD-10-CM

## 2022-05-19 DIAGNOSIS — Z12.5 ENCOUNTER FOR PROSTATE CANCER SCREENING: ICD-10-CM

## 2022-05-19 DIAGNOSIS — Z13.220 ENCOUNTER FOR LIPID SCREENING FOR CARDIOVASCULAR DISEASE: ICD-10-CM

## 2022-05-19 DIAGNOSIS — Z00.00 ENCOUNTER FOR MEDICAL EXAMINATION TO ESTABLISH CARE: Primary | ICD-10-CM

## 2022-05-19 DIAGNOSIS — E29.1 HYPOGONADISM IN MALE: ICD-10-CM

## 2022-05-19 DIAGNOSIS — Z79.899 ENCOUNTER FOR LONG-TERM (CURRENT) USE OF MEDICATIONS: ICD-10-CM

## 2022-05-19 DIAGNOSIS — Z12.11 COLON CANCER SCREENING: ICD-10-CM

## 2022-05-19 DIAGNOSIS — Z12.11 ENCOUNTER FOR COLONOSCOPY DUE TO HISTORY OF COLONIC POLYP: ICD-10-CM

## 2022-05-19 DIAGNOSIS — Z00.00 ENCOUNTER FOR MEDICAL EXAMINATION TO ESTABLISH CARE: ICD-10-CM

## 2022-05-19 DIAGNOSIS — Z86.010 ENCOUNTER FOR COLONOSCOPY DUE TO HISTORY OF COLONIC POLYP: ICD-10-CM

## 2022-05-19 LAB
ALBUMIN SERPL BCP-MCNC: 3.9 G/DL (ref 3.5–5.2)
ALP SERPL-CCNC: 68 U/L (ref 55–135)
ALT SERPL W/O P-5'-P-CCNC: 28 U/L (ref 10–44)
ANION GAP SERPL CALC-SCNC: 12 MMOL/L (ref 8–16)
AST SERPL-CCNC: 23 U/L (ref 10–40)
BILIRUB SERPL-MCNC: 0.7 MG/DL (ref 0.1–1)
BUN SERPL-MCNC: 20 MG/DL (ref 6–20)
CALCIUM SERPL-MCNC: 9.5 MG/DL (ref 8.7–10.5)
CHLORIDE SERPL-SCNC: 105 MMOL/L (ref 95–110)
CHOLEST SERPL-MCNC: 189 MG/DL (ref 120–199)
CHOLEST/HDLC SERPL: 3.5 {RATIO} (ref 2–5)
CO2 SERPL-SCNC: 25 MMOL/L (ref 23–29)
COMPLEXED PSA SERPL-MCNC: 1.3 NG/ML (ref 0–4)
CREAT SERPL-MCNC: 1.4 MG/DL (ref 0.5–1.4)
EST. GFR  (AFRICAN AMERICAN): >60 ML/MIN/1.73 M^2
EST. GFR  (NON AFRICAN AMERICAN): 54.6 ML/MIN/1.73 M^2
ESTIMATED AVG GLUCOSE: 128 MG/DL (ref 68–131)
GLUCOSE SERPL-MCNC: 98 MG/DL (ref 70–110)
HBA1C MFR BLD: 6.1 % (ref 4–5.6)
HDLC SERPL-MCNC: 54 MG/DL (ref 40–75)
HDLC SERPL: 28.6 % (ref 20–50)
HGB BLD-MCNC: 15.2 G/DL (ref 14–18)
LDLC SERPL CALC-MCNC: 116.6 MG/DL (ref 63–159)
NONHDLC SERPL-MCNC: 135 MG/DL
POTASSIUM SERPL-SCNC: 4.1 MMOL/L (ref 3.5–5.1)
PROT SERPL-MCNC: 7.2 G/DL (ref 6–8.4)
SODIUM SERPL-SCNC: 142 MMOL/L (ref 136–145)
TRIGL SERPL-MCNC: 92 MG/DL (ref 30–150)

## 2022-05-19 PROCEDURE — 1159F PR MEDICATION LIST DOCUMENTED IN MEDICAL RECORD: ICD-10-PCS | Mod: CPTII,S$GLB,, | Performed by: FAMILY MEDICINE

## 2022-05-19 PROCEDURE — 83036 HEMOGLOBIN GLYCOSYLATED A1C: CPT | Performed by: FAMILY MEDICINE

## 2022-05-19 PROCEDURE — 3074F PR MOST RECENT SYSTOLIC BLOOD PRESSURE < 130 MM HG: ICD-10-PCS | Mod: CPTII,S$GLB,, | Performed by: FAMILY MEDICINE

## 2022-05-19 PROCEDURE — 99396 PREV VISIT EST AGE 40-64: CPT | Mod: S$GLB,,, | Performed by: FAMILY MEDICINE

## 2022-05-19 PROCEDURE — 1160F PR REVIEW ALL MEDS BY PRESCRIBER/CLIN PHARMACIST DOCUMENTED: ICD-10-PCS | Mod: CPTII,S$GLB,, | Performed by: FAMILY MEDICINE

## 2022-05-19 PROCEDURE — 99396 PR PREVENTIVE VISIT,EST,40-64: ICD-10-PCS | Mod: S$GLB,,, | Performed by: FAMILY MEDICINE

## 2022-05-19 PROCEDURE — 99999 PR PBB SHADOW E&M-EST. PATIENT-LVL V: ICD-10-PCS | Mod: PBBFAC,,, | Performed by: FAMILY MEDICINE

## 2022-05-19 PROCEDURE — 3079F PR MOST RECENT DIASTOLIC BLOOD PRESSURE 80-89 MM HG: ICD-10-PCS | Mod: CPTII,S$GLB,, | Performed by: FAMILY MEDICINE

## 2022-05-19 PROCEDURE — 84153 ASSAY OF PSA TOTAL: CPT | Performed by: FAMILY MEDICINE

## 2022-05-19 PROCEDURE — 3008F PR BODY MASS INDEX (BMI) DOCUMENTED: ICD-10-PCS | Mod: CPTII,S$GLB,, | Performed by: FAMILY MEDICINE

## 2022-05-19 PROCEDURE — 99999 PR PBB SHADOW E&M-EST. PATIENT-LVL V: CPT | Mod: PBBFAC,,, | Performed by: FAMILY MEDICINE

## 2022-05-19 PROCEDURE — 3074F SYST BP LT 130 MM HG: CPT | Mod: CPTII,S$GLB,, | Performed by: FAMILY MEDICINE

## 2022-05-19 PROCEDURE — 85018 HEMOGLOBIN: CPT | Mod: PO | Performed by: FAMILY MEDICINE

## 2022-05-19 PROCEDURE — 36415 COLL VENOUS BLD VENIPUNCTURE: CPT | Mod: PO | Performed by: FAMILY MEDICINE

## 2022-05-19 PROCEDURE — 1159F MED LIST DOCD IN RCRD: CPT | Mod: CPTII,S$GLB,, | Performed by: FAMILY MEDICINE

## 2022-05-19 PROCEDURE — 1160F RVW MEDS BY RX/DR IN RCRD: CPT | Mod: CPTII,S$GLB,, | Performed by: FAMILY MEDICINE

## 2022-05-19 PROCEDURE — 80053 COMPREHEN METABOLIC PANEL: CPT | Performed by: FAMILY MEDICINE

## 2022-05-19 PROCEDURE — 80061 LIPID PANEL: CPT | Performed by: FAMILY MEDICINE

## 2022-05-19 PROCEDURE — 3008F BODY MASS INDEX DOCD: CPT | Mod: CPTII,S$GLB,, | Performed by: FAMILY MEDICINE

## 2022-05-19 PROCEDURE — 3079F DIAST BP 80-89 MM HG: CPT | Mod: CPTII,S$GLB,, | Performed by: FAMILY MEDICINE

## 2022-05-19 RX ORDER — LOSARTAN POTASSIUM AND HYDROCHLOROTHIAZIDE 25; 100 MG/1; MG/1
1 TABLET ORAL DAILY
Qty: 90 TABLET | Refills: 4 | Status: SHIPPED | OUTPATIENT
Start: 2022-05-19 | End: 2023-05-05 | Stop reason: SDUPTHER

## 2022-05-19 NOTE — PROGRESS NOTES
This note is specifically for wellness visit performed today.   WELLNESS EXAM    Patient ID: Ken Lozord Barthelemy is a 57 y.o. male.  has a past medical history of Diverticulitis (2005) and Hypertension.   Chief Complaint:  Encounter for wellness exam    Well Adult Physical: Patient here for a comprehensive physical exam.The patient reports chronic problems.    New patient.  Patient transitioning care fromPrevious PCP:  Dr. Troy [facility]    May 2022:  Reports chronic fatigue.  Patient with history of low testosterone.  He has not had any testosterone replacement.    CHRONIC. STABLE. BP Reviewed.  Compliant with BP medications. No SE reported.   (-) CP, SOB, palpitations, dizziness, lightheadedness, HA, arm numbness, tingling or weakness, syncope.  Creatinine   Date Value Ref Range Status   05/24/2021 1.1 0.5 - 1.4 mg/dL Final     No results found for this or any previous visit.    Answers for HPI/ROS submitted by the patient on 5/19/2022  Chronicity: recurrent  Onset: more than 1 year ago  Progression since onset: rapidly improving  Condition status: controlled  anxiety: No  blurred vision: No  malaise/fatigue: No  orthopnea: No  peripheral edema: No  PND: No  sweats: No  Agents associated with hypertension: no associated agents  CAD risks: obesity  Compliance problems: diet  Improvement on treatment: moderate    Do you take any herbs or supplements that were not prescribed by a doctor? no   Are you taking calcium supplements? No     History: UROLOGIST:  Requesting referral today.    Date last PSA: The natural history of prostate cancer and ongoing controversy regarding screening and potential treatment outcomes of prostate cancer has been discussed with the patient. The meaning of a false positive PSA and a false negative PSA has been discussed. He indicates understanding of the limitations of this screening test and wishes  to proceed with screening PSA testing.    Lab Results   Component Value  Date    PSA 0.95 05/24/2021    PSA 1.3 09/04/2020    PSA 1.2 04/08/2019      Lab Results   Component Value Date    TESTOSTERONE 248 (L) 05/24/2021    TESTOSTERONE 36.5 (L) 05/24/2021      Testosterone, Free (pg/mL)   Date Value   05/24/2021 36.5 (L)     Testosterone (ng/dL)   Date Value   05/24/2021 248 (L)     Testosterone, Total (ng/dL)   Date Value   09/04/2020 260 (L)     Testosterone, Bioavailable (ng/dL)   Date Value   05/24/2021 67.0 (L)      Colon cancer screening:  Findings:        The perianal and digital rectal examinations were normal. Pertinent        negatives include normal sphincter tone, no palpable rectal lesions        and normal prostate (size, shape, and consistency).        Internal hemorrhoids were found during retroflexion. The hemorrhoids        were small.        Two semi-pedunculated polyps were found in the rectum. The polyps        were 2 mm and 4 mm in size. These polyps were removed with a hot        snare. Resection and retrieval were complete.        There was evidence of a prior end-to-end colo-colonic anastomosis in        the recto-sigmoid colon at 20 cm proximal to the anus. This was        patent. This was characterized by healthy appearing mucosa. This was        easily traversed.        Rare tiny small-mouthed diverticula were found in the descending        colon.        A 2 mm polyp was found in the proximal transverse colon. The polyp        was sessile. The polyp was removed with a hot snare. Resection and        retrieval were complete.        The exam was otherwise without abnormality.        The cecum was reached at about 100 cm proximal to the anus.        .        The terminal ileum appeared normal.   Impression:          - Two (2 mm and 4 mm) polyps in the rectum. Resected                        and retrieved.                        - One 2 mm polyp in the proximal transverse colon.                        Resected and retrieved.                        - Diverticulosis  (minimal) in the descending colon.                        - Patent end-to-end colo-colonic anastomosis at 20                        cm, characterized by healthy appearing mucosa.                        - Internal hemorrhoids.                        - The examination was otherwise normal.                        - The examined portion of the ileum was normal.   Recommendation:      - Discharge patient to home.                        - Await pathology results.                        - If the pathology report reveals adenomatous                        tissue, then repeat the colonoscopy for surveillance                        in 5 years.                        - If the pathology report indicates hyperplastic                        polyp, then repeat colonoscopy for surveillance in                        6-7 years.                        - High fiber diet.                        - Continue present medications.                        - No aspirin, ibuprofen, naproxen, or other                        non-steroidal anti-inflammatory drugs for 2 weeks.                        - Call the [facility] clinic in 2 weeks for reports (if                        you haven't heard from us sooner) [phone].                        - Patient has a contact number available for                        emergencies. The signs and symptoms of potential                        delayed complications were discussed with the                        patient. Return to normal activities tomorrow.                        Written discharge instructions were provided to the                        patient.                        - Return to normal activities tomorrow.   Yahir Serrano MD   9/18/2015 10:19:09 AM   Health Maintenance Topics with due status: Not Due       Topic Last Completion Date    TETANUS VACCINE 02/17/2014    Colorectal Cancer Screening 09/18/2015    PROSTATE-SPECIFIC ANTIGEN 05/24/2021    Lipid Panel 07/16/2021    Influenza Vaccine Not  Due      ==============================================  History reviewed.   Health Maintenance Due   Topic Date Due    Shingles Vaccine (1 of 2) Never done    COVID-19 Vaccine (2 - Booster for Lobo series) 10/20/2021       Past Medical History:  Past Medical History:   Diagnosis Date    Diverticulitis 2005    May 2005 and 2005    Hypertension      Past Surgical History:   Procedure Laterality Date    APPENDECTOMY  2005    COLONOSCOPY  2005  [facility]    Diverticulosis    COLONOSCOPY N/A 2015    Procedure: COLONOSCOPY;  Surgeon: Yahir Serrano Jr., MD;  [facility];  Service: Endoscopy;  Laterality: N/A;    MULTIPLE TOOTH EXTRACTIONS      SIGMOIDECTOMY  2006  [facility]    Laparoscopic sigmoidectomy, for recurrent diverticulitis.     Review of patient's allergies indicates:  No Known Allergies  Current Outpatient Medications on File Prior to Visit   Medication Sig Dispense Refill    [DISCONTINUED] losartan-hydrochlorothiazide 100-25 mg (HYZAAR) 100-25 mg per tablet Take 1 tablet by mouth once daily. 90 tablet 3     No current facility-administered medications on file prior to visit.     Social History     Socioeconomic History    Marital status:     Number of children: 2   Tobacco Use    Smoking status: Former Smoker     Packs/day: 0.25     Years: 2.00     Pack years: 0.50     Types: Cigars     Quit date: 2012     Years since quittin.6    Smokeless tobacco: Never Used   Substance and Sexual Activity    Alcohol use: Yes     Alcohol/week: 2.0 standard drinks     Types: 2 Shots of liquor per week     Comment: 1-2 glasses/day    Drug use: No    Sexual activity: Yes     Partners: Female     Family History   Problem Relation Age of Onset    Heart disease Father         73    Hypertension Mother        Review of Systems   Constitutional: Positive for fatigue. Negative for chills, fever and unexpected weight change.   HENT: Negative for ear pain and sore  throat.    Eyes: Negative for redness and visual disturbance.   Respiratory: Negative for cough and shortness of breath.    Cardiovascular: Negative for chest pain and palpitations.   Gastrointestinal: Negative for nausea and vomiting.   Endocrine: Negative for cold intolerance and heat intolerance.   Genitourinary: Negative for difficulty urinating and hematuria.   Musculoskeletal: Negative for arthralgias, myalgias and neck pain.   Skin: Negative for rash and wound.   Allergic/Immunologic: Negative for environmental allergies and food allergies.   Neurological: Negative for weakness and headaches.   Hematological: Negative for adenopathy. Does not bruise/bleed easily.   Psychiatric/Behavioral: Negative for sleep disturbance. The patient is not nervous/anxious.         Objective:     Vitals:    05/19/22 0806   BP: 124/80   Pulse: 65   Resp: 16   Temp: 97.9 °F (36.6 °C)    Body mass index is 32.27 kg/m².  Physical Exam  Vitals and nursing note reviewed.   Constitutional:       General: He is not in acute distress.     Appearance: He is well-developed.   HENT:      Head: Normocephalic and atraumatic.      Right Ear: External ear normal.      Left Ear: External ear normal.      Nose: Nose normal. No rhinorrhea.   Eyes:      Extraocular Movements: Extraocular movements intact.      Pupils: Pupils are equal, round, and reactive to light.   Cardiovascular:      Rate and Rhythm: Normal rate.      Pulses: Normal pulses.   Pulmonary:      Effort: Pulmonary effort is normal. No respiratory distress.      Breath sounds: Normal breath sounds.   Musculoskeletal:         General: Normal range of motion.      Cervical back: Normal range of motion and neck supple.   Skin:     General: Skin is warm and dry.      Capillary Refill: Capillary refill takes less than 2 seconds.   Neurological:      General: No focal deficit present.      Mental Status: He is alert and oriented to person, place, and time.   Psychiatric:         Mood and  Affect: Mood normal.         Behavior: Behavior normal.          Lab Visit on 05/24/2021   Component Date Value Ref Range Status    Follicle Stimulating Hormone 05/24/2021 3.10  0.95 - 11.95 mIU/mL Final    Comment: Female Reference Ranges:  Follicular Phase.................3.03-8.08 mIU/mL  Midcycle Peak....................2.55-16.69 mIU/mL  Luteal Phase.....................1.38-5.47 mIU/mL  Postmenopausal...................26..41 mIU/mL  Male Reference Range:............0.95-11.95 mIU/mL      LH 05/24/2021 2.2  0.6 - 12.1 mIU/mL Final    Comment: Female Reference Ranges:  Follicular phase.............1.8-11.8 mIU/mL  Midcycle phase...............7.6-89.1 mIU/mL  Luteal phase.................0.6-14.0 mIU/mL  Post-menopausal without HRT..5.2-62.0 mIU/mL  Male Reference Interval......0.6-12.1 mIU/mL      Somatomedin (IGF-I) 05/24/2021 54  34 - 232 ng/mL Final    Z Score 05/24/2021 -1.65  -2.0 - 2.0 SD Final    Comment: -------------------ADDITIONAL INFORMATION-------------------  This test was developed and its performance characteristics   determined by [facility] in a manner consistent with CLIA   requirements. This test has not been cleared or approved by   the U.S. Food and Drug Administration.    Test Performed by:  [facility]  [address] [place] [address]  : Damian Colorado M.D. Ph.D.; CLIA# [id number]      PSA, Screen 05/24/2021 0.95  0.00 - 4.00 ng/mL Final    Comment: PSA Expected levels:  Hormonal Therapy: <0.05 ng/ml  Prostatectomy: <0.01 ng/ml  Radiation Therapy: <1.00 ng/ml      WBC 05/24/2021 4.78  3.90 - 12.70 K/uL Final    RBC 05/24/2021 5.26  4.60 - 6.20 M/uL Final    Hemoglobin 05/24/2021 14.6  14.0 - 18.0 g/dL Final    Hematocrit 05/24/2021 45.5  40.0 - 54.0 % Final    MCV 05/24/2021 87  82 - 98 fL Final    MCH 05/24/2021 27.8  27.0 - 31.0 pg Final    MCHC 05/24/2021 32.1  32.0 - 36.0 g/dL Final    RDW  05/24/2021 13.5  11.5 - 14.5 % Final    Platelets 05/24/2021 240  150 - 450 K/uL Final    MPV 05/24/2021 13.3 (A) 9.2 - 12.9 fL Final    Immature Granulocytes 05/24/2021 0.2  0.0 - 0.5 % Final    Gran # (ANC) 05/24/2021 1.4 (A) 1.8 - 7.7 K/uL Final    Immature Grans (Abs) 05/24/2021 0.01  0.00 - 0.04 K/uL Final    Comment: Mild elevation in immature granulocytes is non specific and   can be seen in a variety of conditions including stress response,   acute inflammation, trauma and pregnancy. Correlation with other   laboratory and clinical findings is essential.      Lymph # 05/24/2021 2.6  1.0 - 4.8 K/uL Final    Mono # 05/24/2021 0.5  0.3 - 1.0 K/uL Final    Eos # 05/24/2021 0.2  0.0 - 0.5 K/uL Final    Baso # 05/24/2021 0.03  0.00 - 0.20 K/uL Final    nRBC 05/24/2021 0  0 /100 WBC Final    Gran % 05/24/2021 29.9 (A) 38.0 - 73.0 % Final    Lymph % 05/24/2021 54.6 (A) 18.0 - 48.0 % Final    Mono % 05/24/2021 10.5  4.0 - 15.0 % Final    Eosinophil % 05/24/2021 4.2  0.0 - 8.0 % Final    Basophil % 05/24/2021 0.6  0.0 - 1.9 % Final    Differential Method 05/24/2021 Automated   Final    Sodium 05/24/2021 139  136 - 145 mmol/L Final    Potassium 05/24/2021 4.4  3.5 - 5.1 mmol/L Final    Chloride 05/24/2021 105  95 - 110 mmol/L Final    CO2 05/24/2021 27  23 - 29 mmol/L Final    Glucose 05/24/2021 108  70 - 110 mg/dL Final    BUN 05/24/2021 14  6 - 20 mg/dL Final    Creatinine 05/24/2021 1.1  0.5 - 1.4 mg/dL Final    Calcium 05/24/2021 9.2  8.7 - 10.5 mg/dL Final    Total Protein 05/24/2021 6.9  6.0 - 8.4 g/dL Final    Albumin 05/24/2021 3.6  3.5 - 5.2 g/dL Final    Total Bilirubin 05/24/2021 0.6  0.1 - 1.0 mg/dL Final    Comment: For infants and newborns, interpretation of results should be based  on gestational age, weight and in agreement with clinical  observations.    Premature Infant recommended reference ranges:  Up to 24 hours.............<8.0 mg/dL  Up to 48 hours............<12.0  mg/dL  3-5 days..................<15.0 mg/dL  6-29 days.................<15.0 mg/dL      Alkaline Phosphatase 05/24/2021 67  55 - 135 U/L Final    AST 05/24/2021 23  10 - 40 U/L Final    ALT 05/24/2021 37  10 - 44 U/L Final    Anion Gap 05/24/2021 7 (A) 8 - 16 mmol/L Final    eGFR if African American 05/24/2021 >60.0  >60 mL/min/1.73 m^2 Final    eGFR if non African American 05/24/2021 >60.0  >60 mL/min/1.73 m^2 Final    Comment: Calculation used to obtain the estimated glomerular filtration  rate (eGFR) is the CKD-EPI equation.       Ferritin 05/24/2021 595 (A) 20.0 - 300.0 ng/mL Final    TSH 05/24/2021 2.060  0.400 - 4.000 uIU/mL Final    Testosterone 05/24/2021 248 (A) 250 - 1100 ng/dL Final    Testosterone, Free 05/24/2021 36.5 (A) 46.0 - 224.0 pg/mL Final    Testosterone, Bioavailable 05/24/2021 67.0 (A) 110.0 - 575.0 ng/dL Final    Sex Hormone Binding Globulin 05/24/2021 28  22 - 77 nmol/L Final    Albumin 05/24/2021 4.0  3.6 - 5.1 g/dL Final    Comment: For additional information, please refer to   http://education.Entelo/faq/BEG766 (This link is   being provided for informational/ educational purposes only.)    This test was developed and its analytical performance   characteristics have been determined by [facility]. It has not been cleared or approved by the   US Food and Drug Administration. This assay has been validated   pursuant to the CLIA regulations and is used for clinical   purposes.  @ Test Performed By:  [facility]  Manuel Pathak M.D.,   [address] [place] [id number]  CLIA  [id number]      Estradiol 05/24/2021 26  11 - 44 pg/mL Final    Comment: Estradiol Reference Ranges (Female):  Follicular phase:  pg/mL  Midcycle:          pg/mL  Luteal phase:      pg/mL  Post-menopausal(Not on HRT): <10-28 pg/mL  Post-menopausal(On HRT): < pg/mL  Males: 11-44  pg/mL    The drug Fulvestrant (Faslodex) may interfere with the   assay leading to falsely elevated Estradiol results.    Patients treated with Mifepristone should not be tested with  the  or Alinity I Estradiol assay for up to two   weeks due to the interfernce of the drug in this assay.          Assessment / Plan:    1.  Routine health exam-patient here for annual wellness exam.  Labs ordered.  Health maintenance was reviewed and ordered.  Anticipatory guidance: Don't smoke.  Healthy diet and regular exercise recommended. Vaccine recommendations discussed.  See orders.  Reviewed Anticipatory guidance, risk factor reduction interventions or counseling as needed, Complete history , physical was completed today.  Complete and thorough medication reconciliation was performed.  Discussed risks and benefits of medications.  Advised patient on orders and health maintenance.  We discussed old records and old labs if available.  Will request any records not available through epic.  Continue current medications listed on your summary sheet.  All questions were answered. Patient had no further concerns. Advised of diagnoses and plan. Follow up as planned or return sooner if symptoms persist or worsen.   Hypogonadism in male:  Referral to Urology for further evaluation and treatment.  Discussed risk and benefits of testosterone replacement therapy.  Patient is interested in options.  Hypertension:  Well controlled on losartan hydrochlorothiazide:Counseled on importance of hypertension disease course, I recommend ongoing Education for DASH-diet and exercise.  Counseled on medication regimen importance of treating high blood pressure.  Please be advised of risk of untreated blood pressure as discussed.  Please advised of ER precautions were given for symptoms of hypertensive urgency and emergency.      Orders Placed This Encounter   Procedures    Hemoglobin     Standing Status:   Standing     Number of Occurrences:    99     Standing Expiration Date:   6/14/2041    Comprehensive Metabolic Panel     Standing Status:   Standing     Number of Occurrences:   99     Standing Expiration Date:   6/14/2041    Lipid Panel     Standing Status:   Standing     Number of Occurrences:   99     Standing Expiration Date:   6/14/2041    Hemoglobin A1C     Standing Status:   Standing     Number of Occurrences:   99     Standing Expiration Date:   6/14/2041    PSA, Screening     Standing Status:   Standing     Number of Occurrences:   99     Standing Expiration Date:   5/14/2042    Ambulatory referral/consult to Urology     Standing Status:   Future     Standing Expiration Date:   6/19/2023     Referral Priority:   Routine     Referral Type:   Consultation     Referral Reason:   Specialty Services Required     Referred to Provider:   Eric Mae MD     Requested Specialty:   Urology     Number of Visits Requested:   1    Ambulatory referral/consult to Endo Procedure      Standing Status:   Future     Standing Expiration Date:   11/19/2022     Referral Priority:   Routine     Referral Type:   Consultation     Number of Visits Requested:   1       Medications Ordered This Encounter   Medications    losartan-hydrochlorothiazide 100-25 mg (HYZAAR) 100-25 mg per tablet     Sig: Take 1 tablet by mouth once daily.     Dispense:  90 tablet     Refill:  4     Please delete all prior scripts with same name and strength including on holds.      Future Appointments     Date Provider Specialty Appt Notes    5/19/2022  Lab lobby    5/19/2023 Cheikh Bourne MD Family Medicine Annual Wellness         Cheikh Bourne MD

## 2022-05-19 NOTE — PATIENT INSTRUCTIONS
Follow up in about 1 year (around 5/19/2023), or if symptoms worsen or fail to improve, for annual.     Dear patient,   As a result of recent federal legislation (The Federal Cures Act), you may receive lab or pathology results from your visit in your [facility] account before your physician is able to contact you. Your physician or their representative will relay the results to you with their recommendations at their soonest availability.     If no improvement in symptoms or symptoms worsen, please be advised to call MD, follow-up at clinic and/or go to ER if becomes severe.    Cheikh Bourne M.D.        We Offer TELEHEALTH & Same Day Appointments!   Book your Telehealth appointment with me through my nurse or   Clinic appointments on [facility]!    [address] [place] [address]    Office: [phone]   FAX: [phone]    Check out my Facebook Page and Follow Me at: [email or web address]    Check out my website at [employer] [email or web address] clicking on: [email or web address]    To Schedule appointments online, go to [facility]: [email or web address]

## 2022-05-20 NOTE — PROGRESS NOTES
Make follow-up lab appointment per recommendation below.  Check to see if patient has seen the results through my chart.  If not then,  #CALL THE PATIENT# to discuss results/see if they have questions and document verification of contact. Make F/U appt if needed. [phone]    #My interpretation that was sent to them through [employer]:  Rayo, I have reviewed your recent blood work.     PSA screening for prostate cancer is within normal limits.  Repeat annually.  Your  hemoglobin is normal.    Your metabolic panel which shows your glucose, kidney function, electrolytes, and liver function is normal.   Your cholesterol is stable.    Your hemoglobin A1c is abnormal.  6.1 correlates to an average blood sugar of 128 over the last three months.  This indicates prediabetes.  I recommend that you start lifestyle modification with a low carbohydrate diet and increase in aerobic exercise.  Recheck A1c in six months.  This test is gold standard screening test for diabetes.  It is a measures 3 months of your average blood sugar.    Recommendations as above.  Otherwise recheck annual wellness labs in one year.  =========================  Also please address any outstanding health maintenance that may be due: Shingles Vaccine(1 of 2) Never done  COVID-19 Vaccine(2 - Booster for Lobo series) due on 10/20/2021

## 2022-05-31 ENCOUNTER — PATIENT MESSAGE (OUTPATIENT)
Dept: FAMILY MEDICINE | Facility: CLINIC | Age: 57
End: 2022-05-31
Payer: COMMERCIAL

## 2022-06-08 ENCOUNTER — HOSPITAL ENCOUNTER (OUTPATIENT)
Dept: PREADMISSION TESTING | Facility: HOSPITAL | Age: 57
Discharge: HOME | End: 2022-06-08
Attending: FAMILY MEDICINE
Payer: COMMERCIAL

## 2022-06-08 DIAGNOSIS — Z12.11 COLON CANCER SCREENING: Primary | ICD-10-CM

## 2022-06-08 DIAGNOSIS — Z86.010 ENCOUNTER FOR COLONOSCOPY DUE TO HISTORY OF COLONIC POLYP: ICD-10-CM

## 2022-06-08 DIAGNOSIS — Z12.11 ENCOUNTER FOR COLONOSCOPY DUE TO HISTORY OF COLONIC POLYP: ICD-10-CM

## 2022-06-08 RX ORDER — POLYETHYLENE GLYCOL 3350, SODIUM SULFATE ANHYDROUS, SODIUM BICARBONATE, SODIUM CHLORIDE, POTASSIUM CHLORIDE 236; 22.74; 6.74; 5.86; 2.97 G/4L; G/4L; G/4L; G/4L; G/4L
4 POWDER, FOR SOLUTION ORAL ONCE
Qty: 4000 ML | Refills: 0 | Status: SHIPPED | OUTPATIENT
Start: 2022-06-08 | End: 2022-06-08

## 2022-07-13 NOTE — PROGRESS NOTES
Procedure instructions reviewed. Place, time, nothing to eat or drink after 2 am dose of prep on am of procedure, no chewing gum or sucking on candy, take BP medication with sip of water at least 2 hours after am dose of prep on am of procedure, have someone to drive them home and bowel prep instructions reviewed with pt. Pt verbalized understanding.

## 2022-07-15 ENCOUNTER — HOSPITAL ENCOUNTER (OUTPATIENT)
Facility: HOSPITAL | Age: 57
Discharge: HOME | End: 2022-07-15
Attending: COLON & RECTAL SURGERY | Admitting: COLON & RECTAL SURGERY
Payer: COMMERCIAL

## 2022-07-15 ENCOUNTER — ANESTHESIA EVENT (OUTPATIENT)
Dept: ENDOSCOPY | Facility: HOSPITAL | Age: 57
End: 2022-07-15
Payer: COMMERCIAL

## 2022-07-15 ENCOUNTER — ANESTHESIA (OUTPATIENT)
Dept: ENDOSCOPY | Facility: HOSPITAL | Age: 57
End: 2022-07-15
Payer: COMMERCIAL

## 2022-07-15 VITALS
WEIGHT: 225 LBS | OXYGEN SATURATION: 100 % | SYSTOLIC BLOOD PRESSURE: 111 MMHG | HEIGHT: 70 IN | BODY MASS INDEX: 32.21 KG/M2 | HEART RATE: 47 BPM | RESPIRATION RATE: 18 BRPM | TEMPERATURE: 98 F | DIASTOLIC BLOOD PRESSURE: 74 MMHG

## 2022-07-15 DIAGNOSIS — Z12.11 SCREENING FOR COLON CANCER: ICD-10-CM

## 2022-07-15 PROCEDURE — G0121 COLON CA SCRN NOT HI RSK IND: HCPCS | Performed by: COLON & RECTAL SURGERY

## 2022-07-15 PROCEDURE — 63600175 PHARM REV CODE 636 W HCPCS: Performed by: NURSE ANESTHETIST, CERTIFIED REGISTERED

## 2022-07-15 PROCEDURE — 25000003 PHARM REV CODE 250: Performed by: NURSE ANESTHETIST, CERTIFIED REGISTERED

## 2022-07-15 PROCEDURE — 37000009 HC ANESTHESIA EA ADD 15 MINS: Performed by: COLON & RECTAL SURGERY

## 2022-07-15 PROCEDURE — 37000008 HC ANESTHESIA 1ST 15 MINUTES: Performed by: COLON & RECTAL SURGERY

## 2022-07-15 PROCEDURE — G0121 COLON CA SCRN NOT HI RSK IND: ICD-10-PCS | Mod: ,,, | Performed by: COLON & RECTAL SURGERY

## 2022-07-15 PROCEDURE — G0121 COLON CA SCRN NOT HI RSK IND: HCPCS | Mod: ,,, | Performed by: COLON & RECTAL SURGERY

## 2022-07-15 RX ORDER — PROPOFOL 10 MG/ML
VIAL (ML) INTRAVENOUS
Status: DISCONTINUED | OUTPATIENT
Start: 2022-07-15 | End: 2022-07-15

## 2022-07-15 RX ORDER — LIDOCAINE HYDROCHLORIDE 10 MG/ML
INJECTION, SOLUTION EPIDURAL; INFILTRATION; INTRACAUDAL; PERINEURAL
Status: DISCONTINUED | OUTPATIENT
Start: 2022-07-15 | End: 2022-07-15

## 2022-07-15 RX ADMIN — PROPOFOL 110 MG: 10 INJECTION, EMULSION INTRAVENOUS at 10:07

## 2022-07-15 RX ADMIN — LIDOCAINE HYDROCHLORIDE 50 MG: 10 INJECTION, SOLUTION EPIDURAL; INFILTRATION; INTRACAUDAL; PERINEURAL at 10:07

## 2022-07-15 RX ADMIN — PROPOFOL 30 MG: 10 INJECTION, EMULSION INTRAVENOUS at 10:07

## 2022-07-15 RX ADMIN — SODIUM CHLORIDE, SODIUM LACTATE, POTASSIUM CHLORIDE, AND CALCIUM CHLORIDE: .6; .31; .03; .02 INJECTION, SOLUTION INTRAVENOUS at 10:07

## 2022-07-15 NOTE — ANESTHESIA RELEASE NOTE
"Anesthesia Release from PACU Note    Patient: Ken Lozord Barthelemy    Procedure(s) Performed: Procedure(s) (LRB):  COLONOSCOPY (N/A)    Anesthesia type: MAC    Post pain: Adequate analgesia    Post assessment: no apparent anesthetic complications    Last Vitals:   Visit Vitals  /70 (BP Location: Left arm, Patient Position: Lying)   Pulse (!) 56   Temp 36.7 °C (98.1 °F) (Temporal)   Resp 16   Ht 5' 10" (1.778 m)   Wt 102.1 kg (225 lb)   SpO2 97%   BMI 32.28 kg/m²       Post vital signs: stable    Level of consciousness: awake    Nausea/Vomiting: no nausea/no vomiting    Complications: none    Airway Patency: patent    Respiratory: unassisted    Cardiovascular: stable and blood pressure at baseline    Hydration: euvolemic  "

## 2022-07-15 NOTE — ANESTHESIA PREPROCEDURE EVALUATION
07/15/2022  Ken Lozord Barthelemy is a 57 y.o., male.      Pre-op Assessment    I have reviewed the Patient Summary Reports.     I have reviewed the Nursing Notes. I have reviewed the NPO Status.   I have reviewed the Medications.     Review of Systems  Anesthesia Hx:  No problems with previous Anesthesia    Social:  Former Smoker    Hematology/Oncology:  Hematology Normal   Oncology Normal     EENT/Dental:EENT/Dental Normal   Cardiovascular:   Hypertension, well controlled    Pulmonary:   Sleep Apnea, CPAP    Renal/:  Renal/ Normal     Hepatic/GI:  Hepatic/GI Normal Bowel Prep.  Bowel Conditions:  Diverticulitis    Musculoskeletal:  Musculoskeletal Normal    Neurological:  Neurology Normal    Endocrine:  Endocrine Normal  Metabolic Disorders, Obesity / BMI > 30  Dermatological:  Skin Normal    Psych:  Psychiatric Normal           Physical Exam  General: Well nourished    Airway:  Mallampati: II   Mouth Opening: Normal  TM Distance: Normal  Tongue: Normal  Neck ROM: Normal ROM    Dental:  Intact    Chest/Lungs:  Clear to auscultation    Heart:  Rate: Normal        Anesthesia Plan  Type of Anesthesia, risks & benefits discussed:    Anesthesia Type: MAC  Intra-op Monitoring Plan: Standard ASA Monitors  Induction:  IV  Informed Consent: Informed consent signed with the Patient and all parties understand the risks and agree with anesthesia plan.  All questions answered. Patient consented to blood products? Yes  ASA Score: 2    Ready For Surgery From Anesthesia Perspective.     .

## 2022-07-15 NOTE — ANESTHESIA POSTPROCEDURE EVALUATION
Anesthesia Post Evaluation    Patient: Ken Lozord Barthelemy    Procedure(s) Performed: Procedure(s) (LRB):  COLONOSCOPY (N/A)    Final Anesthesia Type: MAC      Patient location during evaluation: PACU  Patient participation: Yes- Able to Participate  Level of consciousness: awake and alert  Post-procedure vital signs: reviewed and stable  Pain management: adequate  Airway patency: patent    PONV status at discharge: No PONV  Anesthetic complications: no      Cardiovascular status: blood pressure returned to baseline  Respiratory status: unassisted  Hydration status: euvolemic  Follow-up not needed.          Vitals Value Taken Time   /70 07/15/22 1053   Temp 98 07/15/22 1056   Pulse 56 07/15/22 1053   Resp 16 07/15/22 1053   SpO2 97 % 07/15/22 1053         No case tracking events are documented in the log.      Pain/Pedro Score: No data recorded

## 2022-07-15 NOTE — PROVATION PATIENT INSTRUCTIONS
Discharge Summary/Instructions after an Endoscopic Procedure  Patient Name: Ken Barthelemy  Patient MRN: [id number]  Patient YOB: 1965  Friday, July 15, 2022 Federico Valladares MD  Dear patient,  As a result of recent federal legislation (The Federal Cures Act), you may   receive lab or pathology results from your procedure in your [facility]   account before your physician is able to contact you. Your physician or   their representative will relay the results to you with their   recommendations at their soonest availability.  Thank you,  RESTRICTIONS:  During your procedure today, you received medications for sedation.  These   medications may affect your judgment, balance and coordination.  Therefore,   for 24 hours, you have the following restrictions:   - DO NOT drive a car, operate machinery, make legal/financial decisions,   sign important papers or drink alcohol.    ACTIVITY:  Today: no heavy lifting, straining or running due to procedural   sedation/anesthesia.  The following day: return to full activity including work.  DIET:  Eat and drink normally unless instructed otherwise.     TREATMENT FOR COMMON SIDE EFFECTS:  - Mild abdominal pain, nausea, belching, bloating or excessive gas:  rest,   eat lightly and use a heating pad.  - Sore Throat: treat with throat lozenges and/or gargle with warm salt   water.  - Because air was used during the procedure, expelling large amounts of air   from your rectum or belching is normal.  - If a bowel prep was taken, you may not have a bowel movement for 1-3 days.    This is normal.  SYMPTOMS TO WATCH FOR AND REPORT TO YOUR PHYSICIAN:  1. Abdominal pain or bloating, other than gas cramps.  2. Chest pain.  3. Back pain.  4. Signs of infection such as: chills or fever occurring within 24 hours   after the procedure.  5. Rectal bleeding, which would show as bright red, maroon, or black stools.   (A tablespoon of blood from the rectum is not serious, especially if    hemorrhoids are present.)  6. Vomiting.  7. Weakness or dizziness.  GO DIRECTLY TO THE NEAREST EMERGENCY ROOM IF YOU HAVE ANY OF THE FOLLOWING:      Difficulty breathing              Chills and/or fever over 101 F   Persistent vomiting and/or vomiting blood   Severe abdominal pain   Severe chest pain   Black, tarry stools   Bleeding- more than one tablespoon   Any other symptom or condition that you feel may need urgent attention  Your doctor recommends these additional instructions:  If any biopsies were taken, your doctors clinic will contact you in 1 to 2   weeks with any results.  - Discharge patient to home.   - High fiber diet.   - Continue present medications.   - Repeat colonoscopy in 10 years for screening purposes.   - Return to primary care physician PRN.  For questions, problems or results please call your physician Federico Valladares MD at Work:  [phone]  If you have any questions about the above instructions, call the GI   department at [phone] or call the endoscopy unit at [phone]   from 7am until 3 pm.  [facility] [place], EMERGENCY ROOM PHONE NUMBER:   [phone]  IF A COMPLICATION OR EMERGENCY SITUATION ARISES AND YOU ARE UNABLE TO REACH   YOUR PHYSICIAN - GO DIRECTLY TO THE EMERGENCY ROOM.  I have read or have had read to me these discharge instructions for my   procedure and have received a written copy.  I understand these   instructions and will follow-up with my physician if I have any questions.     __________________________________       _____________________________________  Nurse Signature                                          Patient/Designated   Responsible Party Signature  MD Federico Gray MD  7/15/2022 10:55:08 AM  This report has been verified and signed electronically.  Dear patient,  As a result of recent federal legislation (The Federal Cures Act), you may   receive lab or pathology results from your procedure  in your [facility]   account before your physician is able to contact you. Your physician or   their representative will relay the results to you with their   recommendations at their soonest availability.  Thank you,  PROVATION

## 2022-07-15 NOTE — TRANSFER OF CARE
"Anesthesia Transfer of Care Note    Patient: Ken Lozord Barthelemy    Procedure(s) Performed: Procedure(s) (LRB):  COLONOSCOPY (N/A)    Patient location: PACU    Anesthesia Type: MAC    Transport from OR: Transported from OR on room air with adequate spontaneous ventilation    Post pain: adequate analgesia    Post assessment: no apparent anesthetic complications    Post vital signs: stable    Level of consciousness: awake    Nausea/Vomiting: no nausea/vomiting    Complications: none    Transfer of care protocol was followed      Last vitals:   Visit Vitals  /70 (BP Location: Left arm, Patient Position: Lying)   Pulse (!) 56   Temp 36.7 °C (98.1 °F) (Temporal)   Resp 16   Ht 5' 10" (1.778 m)   Wt 102.1 kg (225 lb)   SpO2 97%   BMI 32.28 kg/m²     "

## 2022-07-15 NOTE — H&P
[facility] ([facility])  Colon and Rectal Surgery  History & Physical    Patient Name: Ken Lozord Barthelemy  MRN: [id number]  Admission Date: 7/15/2022  Attending Physician: Federico Valladares MD  Primary Care Provider: Cheikh Bourne MD    Patient information was obtained from patient and medical records.    Subjective:     Chief Complaint/Reason for Admission: Here for Colonoscopy    History of Present Illness:  Patient is a 57 y.o. male presents for colonoscopy. Last cscope in . Has previously had sigmoid resection for diverticulitis. No current hematochezia, melena or change in bowel habits. No fam hx of CRC or IBD.    No current facility-administered medications on file prior to encounter.     Current Outpatient Medications on File Prior to Encounter   Medication Sig    losartan-hydrochlorothiazide 100-25 mg (HYZAAR) 100-25 mg per tablet Take 1 tablet by mouth once daily.       Review of patient's allergies indicates:  No Known Allergies    Past Medical History:   Diagnosis Date    Diverticulitis 2005    May 2005 and 2005    Hypertension      Past Surgical History:   Procedure Laterality Date    APPENDECTOMY  2005    COLONOSCOPY  2005  [facility]    Diverticulosis    COLONOSCOPY N/A 2015    Procedure: COLONOSCOPY;  Surgeon: Yahir Serrano Jr., MD;  Location: [facility];  Service: Endoscopy;  Laterality: N/A;    MULTIPLE TOOTH EXTRACTIONS      SIG[facility]OMY  2006  [facility]    Laparoscopic sigmoidectomy, for recurrent diverticulitis.     Family History     Problem Relation (Age of Onset)    Heart disease Father    Hypertension Mother        Tobacco Use    Smoking status: Former Smoker     Packs/day: 0.25     Years: 2.00     Pack years: 0.50     Types: Cigars     Quit date: 2012     Years since quittin.8    Smokeless tobacco: Never Used   Substance and Sexual Activity    Alcohol use: Yes     Alcohol/week: 2.0 standard drinks     Types: 2 Shots of liquor per week      Comment: 1-2 glasses/day    Drug use: No    Sexual activity: Yes     Partners: Female     Review of Systems   Constitutional: Negative for activity change, appetite change, chills, fatigue, fever and unexpected weight change.   HENT: Negative for congestion, ear pain, sore throat and trouble swallowing.    Eyes: Negative for pain, redness and itching.   Respiratory: Negative for cough, shortness of breath and wheezing.    Cardiovascular: Negative for chest pain, palpitations and leg swelling.   Gastrointestinal: Negative for abdominal distention, abdominal pain, anal bleeding, blood in stool, constipation, diarrhea, nausea, rectal pain and vomiting.   Endocrine: Negative for cold intolerance, heat intolerance and polyuria.   Genitourinary: Negative for dysuria, flank pain, frequency and hematuria.   Musculoskeletal: Negative for gait problem, joint swelling and neck pain.   Skin: Negative for color change, rash and wound.   Allergic/Immunologic: Negative for environmental allergies and immunocompromised state.   Neurological: Negative for dizziness, speech difficulty, weakness and numbness.   Psychiatric/Behavioral: Negative for agitation, confusion and hallucinations.     Objective:     Vital Signs (Most Recent):  Temp: 98.1 °F (36.7 °C) (07/15/22 0958)  Pulse: 60 (07/15/22 0958)  Resp: 16 (07/15/22 0958)  BP: 125/78 (07/15/22 0958)  SpO2: 95 % (07/15/22 0958) Vital Signs (24h Range):  Temp:  [98.1 °F (36.7 °C)] 98.1 °F (36.7 °C)  Pulse:  [60] 60  Resp:  [16] 16  SpO2:  [95 %] 95 %  BP: (125)/(78) 125/78     Weight: 102.1 kg (225 lb)  Body mass index is 32.28 kg/m².    Physical Exam  Constitutional:       Appearance: He is well-developed.   HENT:      Head: Normocephalic and atraumatic.   Eyes:      Conjunctiva/sclera: Conjunctivae normal.   Neck:      Thyroid: No thyromegaly.   Cardiovascular:      Rate and Rhythm: Normal rate and regular rhythm.   Pulmonary:      Effort: Pulmonary effort is normal. No  respiratory distress.   Abdominal:      General: There is no distension.      Palpations: Abdomen is soft. There is no mass.      Tenderness: There is no abdominal tenderness.   Musculoskeletal:         General: No tenderness. Normal range of motion.      Cervical back: Normal range of motion.   Skin:     General: Skin is warm and dry.      Capillary Refill: Capillary refill takes less than 2 seconds.      Findings: No rash.   Neurological:      Mental Status: He is alert and oriented to person, place, and time.       Assessment/Plan:     Patient is a 57 y.o. male who presents for colonoscopy     - Ok to proceed to endoscopy suite for colonoscopy  - Consent obtained. All risks, benefits and alternatives fully explained to patient, including but not limited to bleeding, infection, perforation, and missed polyps. All questions appropriately answered to patient's satisfaction. Consent signed and placed on chart.    There are no hospital problems to display for this patient.    VTE Risk Mitigation (From admission, onward)    None          Federico Valladares MD  Colon and Rectal Surgery  [facility] ([facility])

## 2022-07-15 NOTE — BRIEF OP NOTE
[facility] ([facility])  Brief Operative Note     SUMMARY     Surgery Date: 7/15/2022     Surgeon(s) and Role:     * Federico Valladares MD - Primary    Assisting Surgeon: None    Pre-op Diagnosis:  Colon cancer screening [Z12.11]  Encounter for colonoscopy due to history of colonic polyp [Z12.11, Z86.010]    Post-op Diagnosis:  Post-Op Diagnosis Codes:     * Colon cancer screening [Z12.11]     * Encounter for colonoscopy due to history of colonic polyp [Z12.11, Z86.010]    Procedure(s) (LRB):  COLONOSCOPY (N/A)    Anesthesia: Choice    Description of the findings of the procedure: No polyps or lesions; healthy anastomosis; diverticulosis and hemorrhoids    Estimated Blood Loss: * No values recorded between 7/15/2022 12:00 AM and 7/15/2022 10:51 AM *         Specimens:   Specimen (24h ago, onward)            None          Discharge Note    SUMMARY     Admit Date: 7/15/2022    Discharge Date and Time: 7/15/2022 10:55 AM    Hospital Course Patient was seen in the preoperative area by both myself and anesthesia. All consents were verified and all questions appropriately answered. All risks, benefits and alternatives explained to patient. Patient proceeded to endoscopy suite for colonoscopy and was discharged home postoperative once cleared by anesthesia.    Final Diagnosis: Post-Op Diagnosis Codes:     * Colon cancer screening [Z12.11]     * Encounter for colonoscopy due to history of colonic polyp [Z12.11, Z86.010]    Disposition: Home or Self Care    Follow Up/Patient Instructions: See Provation report    Medications:  Reconciled Home Medications:      Medication List      CONTINUE taking these medications    losartan-hydrochlorothiazide 100-25 mg 100-25 mg per tablet  Commonly known as: HYZAAR  Take 1 tablet by mouth once daily.          Discharge Procedure Orders   Diet general     Call MD for:  temperature >100.4     Call MD for:  persistent nausea and vomiting     Call MD for:  severe uncontrolled pain      Call MD for:  difficulty breathing, headache or visual disturbances     Call MD for:  redness, tenderness, or signs of infection (pain, swelling, redness, odor or green/yellow discharge around incision site)     Call MD for:  hives     Call MD for:  persistent dizziness or light-headedness     Call MD for:  extreme fatigue     Activity as tolerated      Follow-up Information     Cheikh Bourne MD Follow up.    Specialty: Family Medicine  Why: As needed  Contact information:  [address] [facility] [address] [phone]

## 2023-04-19 ENCOUNTER — TELEPHONE (OUTPATIENT)
Dept: FAMILY MEDICINE | Facility: CLINIC | Age: 58
End: 2023-04-19
Payer: COMMERCIAL

## 2023-04-19 NOTE — TELEPHONE ENCOUNTER
----- Message from Morena Cheatham sent at 4/18/2023  1:14 PM CDT -----  Called pt to [facility] appt from 5/19.  I am showing nothing until after June. I offered np and pt refused only wants to see you. Wants staff to call and get him worked in

## 2023-05-05 ENCOUNTER — OFFICE VISIT (OUTPATIENT)
Dept: FAMILY MEDICINE | Facility: CLINIC | Age: 58
End: 2023-05-05
Payer: COMMERCIAL

## 2023-05-05 VITALS
SYSTOLIC BLOOD PRESSURE: 138 MMHG | RESPIRATION RATE: 16 BRPM | TEMPERATURE: 99 F | DIASTOLIC BLOOD PRESSURE: 88 MMHG | WEIGHT: 223 LBS | HEART RATE: 73 BPM | BODY MASS INDEX: 31.92 KG/M2 | HEIGHT: 70 IN

## 2023-05-05 DIAGNOSIS — G89.29 CHRONIC PAIN OF LEFT KNEE: ICD-10-CM

## 2023-05-05 DIAGNOSIS — R41.3 MEMORY LOSS: ICD-10-CM

## 2023-05-05 DIAGNOSIS — Z00.00 WELL ADULT EXAM: Primary | ICD-10-CM

## 2023-05-05 DIAGNOSIS — M25.562 CHRONIC PAIN OF LEFT KNEE: ICD-10-CM

## 2023-05-05 DIAGNOSIS — Z13.220 ENCOUNTER FOR LIPID SCREENING FOR CARDIOVASCULAR DISEASE: ICD-10-CM

## 2023-05-05 DIAGNOSIS — Z13.6 ENCOUNTER FOR LIPID SCREENING FOR CARDIOVASCULAR DISEASE: ICD-10-CM

## 2023-05-05 DIAGNOSIS — Z79.899 ENCOUNTER FOR LONG-TERM (CURRENT) USE OF MEDICATIONS: ICD-10-CM

## 2023-05-05 DIAGNOSIS — E88.810 METABOLIC SYNDROME: ICD-10-CM

## 2023-05-05 DIAGNOSIS — I10 ESSENTIAL HYPERTENSION: ICD-10-CM

## 2023-05-05 DIAGNOSIS — Z82.49 FAMILY HISTORY OF HEART DISEASE: ICD-10-CM

## 2023-05-05 PROCEDURE — 99396 PREV VISIT EST AGE 40-64: CPT | Mod: S$GLB,,, | Performed by: FAMILY MEDICINE

## 2023-05-05 PROCEDURE — 1159F PR MEDICATION LIST DOCUMENTED IN MEDICAL RECORD: ICD-10-PCS | Mod: CPTII,S$GLB,, | Performed by: FAMILY MEDICINE

## 2023-05-05 PROCEDURE — 1160F PR REVIEW ALL MEDS BY PRESCRIBER/CLIN PHARMACIST DOCUMENTED: ICD-10-PCS | Mod: CPTII,S$GLB,, | Performed by: FAMILY MEDICINE

## 2023-05-05 PROCEDURE — 1160F RVW MEDS BY RX/DR IN RCRD: CPT | Mod: CPTII,S$GLB,, | Performed by: FAMILY MEDICINE

## 2023-05-05 PROCEDURE — 1159F MED LIST DOCD IN RCRD: CPT | Mod: CPTII,S$GLB,, | Performed by: FAMILY MEDICINE

## 2023-05-05 PROCEDURE — 3075F SYST BP GE 130 - 139MM HG: CPT | Mod: CPTII,S$GLB,, | Performed by: FAMILY MEDICINE

## 2023-05-05 PROCEDURE — 99999 PR PBB SHADOW E&M-EST. PATIENT-LVL V: CPT | Mod: PBBFAC,,, | Performed by: FAMILY MEDICINE

## 2023-05-05 PROCEDURE — 3075F PR MOST RECENT SYSTOLIC BLOOD PRESS GE 130-139MM HG: ICD-10-PCS | Mod: CPTII,S$GLB,, | Performed by: FAMILY MEDICINE

## 2023-05-05 PROCEDURE — 3079F DIAST BP 80-89 MM HG: CPT | Mod: CPTII,S$GLB,, | Performed by: FAMILY MEDICINE

## 2023-05-05 PROCEDURE — 3079F PR MOST RECENT DIASTOLIC BLOOD PRESSURE 80-89 MM HG: ICD-10-PCS | Mod: CPTII,S$GLB,, | Performed by: FAMILY MEDICINE

## 2023-05-05 PROCEDURE — 3008F PR BODY MASS INDEX (BMI) DOCUMENTED: ICD-10-PCS | Mod: CPTII,S$GLB,, | Performed by: FAMILY MEDICINE

## 2023-05-05 PROCEDURE — 99999 PR PBB SHADOW E&M-EST. PATIENT-LVL V: ICD-10-PCS | Mod: PBBFAC,,, | Performed by: FAMILY MEDICINE

## 2023-05-05 PROCEDURE — 3008F BODY MASS INDEX DOCD: CPT | Mod: CPTII,S$GLB,, | Performed by: FAMILY MEDICINE

## 2023-05-05 PROCEDURE — 99396 PR PREVENTIVE VISIT,EST,40-64: ICD-10-PCS | Mod: S$GLB,,, | Performed by: FAMILY MEDICINE

## 2023-05-05 RX ORDER — MELOXICAM 7.5 MG/1
7.5 TABLET ORAL DAILY
Qty: 90 TABLET | Refills: 4 | Status: SHIPPED | OUTPATIENT
Start: 2023-05-05 | End: 2023-06-28

## 2023-05-05 RX ORDER — LOSARTAN POTASSIUM AND HYDROCHLOROTHIAZIDE 25; 100 MG/1; MG/1
1 TABLET ORAL DAILY
Qty: 90 TABLET | Refills: 4 | Status: SHIPPED | OUTPATIENT
Start: 2023-05-05 | End: 2024-05-04

## 2023-05-05 NOTE — PATIENT INSTRUCTIONS
Follow up in about 6 months (around 11/5/2023) for HTN, Med refills.     Dear patient,   As a result of recent federal legislation (The Federal Cures Act), you may receive lab or pathology results from your visit in your [facility] account before your physician is able to contact you. Your physician or their representative will relay the results to you with their recommendations at their soonest availability.     If no improvement in symptoms or symptoms worsen, please be advised to call MD, follow-up at clinic and/or go to ER if becomes severe.    Cheikh Bourne M.D.        We Offer TELEHEALTH & Same Day Appointments!   Book your Telehealth appointment with me through my nurse or   Clinic appointments on [facility]!    [address] [place] [address]    Office: [phone]   FAX: [phone]    Check out my Facebook Page and Follow Me at: [email or web address]    Check out my website at [email or web address] by clicking on: [email or web address]    To Schedule appointments online, go to [email or web address]: [email or web address]

## 2023-05-05 NOTE — PROGRESS NOTES
This note is specifically for wellness visit performed today.   WELLNESS EXAM    Patient ID: Ken Lozord Barthelemy is a 58 y.o. male.  has a past medical history of Diverticulitis (2005) and Hypertension.   Chief Complaint:  Encounter for wellness exam    Well Adult Physical: Patient here for a comprehensive physical exam.The patient reports chronic problems.  The patient's last visit with me was on 5/19/2022.    Reviewed Care team:Previous PCP:  Dr. Ernesto [facility]ene-[facility]  URo ref Tu  Eye CThomas- wears glasses  ORtho Abisai Bond MD  GI ? - history of colon resection due to recurrent diverticulitis.  Colonoscopy up-to-date.    May 2023:  Patient here for annual checkup.  Reports still having chronic knee pain.  Patient not taking any medication for this.  Blood pressures been well controlled at home however elevated today.  Patient has not had labs since last year.  He was noted to be prediabetes was supposed to have labs in six months but did not have them performed.  Patient has not made much change to diet or exercise regimen.  Reports family history of heart disease.  Patient has not seen a cardiologist.    He has metabolic syndrome with central adiposity, BMI 32, hypertension, prediabetes/insulin resistance.  Patient denies any history of thyroid cancer, pancreatitis, MEN syndrome.  Diabetes Management Status    Statin: Not taking  ACE/ARB: Taking    Screening or Prevention Patient's value Goal Complete/Controlled?   HgA1C Testing and Control   Lab Results   Component Value Date    HGBA1C 6.1 (H) 05/19/2022      Annually/Less than 8% Yes     Lipid profile : 05/19/2022 Annually Yes     LDL control Lab Results   Component Value Date    LDLCALC 116.6 05/19/2022    Annually/Less than 100 mg/dl  No     Nephropathy screening Lab Results   Component Value Date    LABMICR 5.0 10/18/2017     Lab Results   Component Value Date    PROTEINUA NEG 11/27/2005     No results found for: UTPCR   Annually  No     Blood pressure BP Readings from Last 1 Encounters:   05/05/23 138/88    Less than 140/90 Yes     Dilated retinal exam Most Recent Eye Exam Date: Not Found Annually No     Foot exam   Most Recent Foot Exam Date: Not Found Annually No            New patient.  Patient transitioning care fromPrevious PCP:  Dr. Ernesto [facility]  May 2022:  Reports chronic fatigue.  Patient with history of low testosterone.  He has not had any testosterone replacement.    Hypertension:    Hypertension Medications               losartan-hydrochlorothiazide 100-25 mg (HYZAAR) 100-25 mg per tablet Take 1 tablet by mouth once daily.          CHRONIC. . BP Reviewed.  Compliant with BP medications. No SE reported.   (-) CP, SOB, palpitations, dizziness, lightheadedness, HA, arm numbness, tingling or weakness, syncope.  Creatinine   Date Value Ref Range Status   05/19/2022 1.4 0.5 - 1.4 mg/dL Final     No results found for this or any previous visit.    Do you take any herbs or supplements that were not prescribed by a doctor? no   Are you taking calcium supplements? No     History: UROLOGIST:  Referral to Eric Mae previously    Date last PSA: The natural history of prostate cancer and ongoing controversy regarding screening and potential treatment outcomes of prostate cancer has been discussed with the patient. The meaning of a false positive PSA and a false negative PSA has been discussed. He indicates understanding of the limitations of this screening test and wishes  to proceed with screening PSA testing.    Lab Results   Component Value Date    PSA 1.3 05/19/2022    PSA 0.95 05/24/2021    PSA 1.3 09/04/2020      Lab Results   Component Value Date    TESTOSTERONE 248 (L) 05/24/2021    TESTOSTERONE 36.5 (L) 05/24/2021      Testosterone, Free (pg/mL)   Date Value   05/24/2021 36.5 (L)     Testosterone (ng/dL)   Date Value   05/24/2021 248 (L)     Testosterone, Total (ng/dL)   Date Value   09/04/2020 260 (L)      Testosterone, Bioavailable (ng/dL)   Date Value   05/24/2021 67.0 (L)      Colon cancer screening:  Findings:        Hemorrhoids were found on perianal exam.        The terminal ileum appeared normal.        There was evidence of a prior end-to-end colo-rectal anastomosis in        the rectum. This was patent and was characterized by healthy        appearing mucosa. The anastomosis was traversed.        Multiple small-mouthed diverticula were found in the descending        colon.        Non-bleeding external and internal hemorrhoids were found.        Anal papilla(e) were hypertrophied.   Impression:            - Preparation of the colon was fair.                          - Hemorrhoids found on perianal exam.                          - The examined portion of the ileum was normal.                          - Patent end-to-end colo-rectal anastomosis,                          characterized by healthy appearing mucosa.                          - Diverticulosis in the descending colon.                          - Non-bleeding external and internal hemorrhoids.                          - Anal papilla(e) were hypertrophied.                          - No specimens collected.   Recommendation:        - Discharge patient to home.                          - High fiber diet.                          - Continue present medications.                          - Repeat colonoscopy in 10 years for screening                          purposes.                          - Return to primary care physician PRN.   Attending Participation:        I personally performed the entire procedure.   MD Federico Gray MD   7/15/2022 10:55:08 AM     Findings:        The perianal and digital rectal examinations were normal. Pertinent        negatives include normal sphincter tone, no palpable rectal lesions        and normal prostate (size, shape, and consistency).        Internal hemorrhoids were found during  retroflexion. The hemorrhoids        were small.        Two semi-pedunculated polyps were found in the rectum. The polyps        were 2 mm and 4 mm in size. These polyps were removed with a hot        snare. Resection and retrieval were complete.        There was evidence of a prior end-to-end colo-colonic anastomosis in        the recto-sigmoid colon at 20 cm proximal to the anus. This was        patent. This was characterized by healthy appearing mucosa. This was        easily traversed.        Rare tiny small-mouthed diverticula were found in the descending        colon.        A 2 mm polyp was found in the proximal transverse colon. The polyp        was sessile. The polyp was removed with a hot snare. Resection and        retrieval were complete.        The exam was otherwise without abnormality.        The cecum was reached at about 100 cm proximal to the anus.        .        The terminal ileum appeared normal.   Impression:          - Two (2 mm and 4 mm) polyps in the rectum. Resected                        and retrieved.                        - One 2 mm polyp in the proximal transverse colon.                        Resected and retrieved.                        - Diverticulosis (minimal) in the descending colon.                        - Patent end-to-end colo-colonic anastomosis at 20                        cm, characterized by healthy appearing mucosa.                        - Internal hemorrhoids.                        - The examination was otherwise normal.                        - The examined portion of the ileum was normal.   Recommendation:      - Discharge patient to home.                        - Await pathology results.                        - If the pathology report reveals adenomatous                        tissue, then repeat the colonoscopy for surveillance                        in 5 years.                        - If the pathology report indicates hyperplastic                        polyp,  then repeat colonoscopy for surveillance in                        6-7 years.                        - High fiber diet.                        - Continue present medications.                        - No aspirin, ibuprofen, naproxen, or other                        non-steroidal anti-inflammatory drugs for 2 weeks.                        - Call the [facility] clinic in 2 weeks for reports (if                        you haven't heard from us sooner) [phone].                        - Patient has a contact number available for                        emergencies. The signs and symptoms of potential                        delayed complications were discussed with the                        patient. Return to normal activities tomorrow.                        Written discharge instructions were provided to the                        patient.                        - Return to normal activities tomorrow.   Yahir Serrano MD   9/18/2015 10:19:09 AM   Health Maintenance Topics with due status: Not Due       Topic Last Completion Date    TETANUS VACCINE 02/17/2014    PROSTATE-SPECIFIC ANTIGEN 05/19/2022    Hemoglobin A1c (Diabetic Prevention Screening) 05/19/2022    Lipid Panel 05/19/2022    Colorectal Cancer Screening 07/15/2022    Influenza Vaccine Not Due      ==============================================  History reviewed.   Health Maintenance Due   Topic Date Due    Shingles Vaccine (1 of 2) Never done    COVID-19 Vaccine (2 - Booster for Lobo series) 10/20/2021       Past Medical History:  Past Medical History:   Diagnosis Date    Diverticulitis 2005    May 2005 and Nov 2005    Hypertension      Past Surgical History:   Procedure Laterality Date    APPENDECTOMY  11/01/2005    COLONOSCOPY  4/26/2005  [facility]    Diverticulosis    COLONOSCOPY N/A 09/18/2015    Procedure: COLONOSCOPY;  Surgeon: Yahir Serrano Jr., MD;  Location: [facility];  Service: Endoscopy;  Laterality: N/A;    COLONOSCOPY N/A 7/15/2022     Procedure: COLONOSCOPY;  Surgeon: Federico Valladares MD;  Location: [facility];  Service: General;  Laterality: N/A;    MULTIPLE TOOTH EXTRACTIONS      SIGMOIDECTOMY  1/17/2006  [facility]    Laparoscopic sigmoidectomy, for recurrent diverticulitis.     Review of patient's allergies indicates:  No Known Allergies  Current Outpatient Medications on File Prior to Visit   Medication Sig Dispense Refill    [DISCONTINUED] losartan-hydrochlorothiazide 100-25 mg (HYZAAR) 100-25 mg per tablet Take 1 tablet by mouth once daily. 90 tablet 4     No current facility-administered medications on file prior to visit.     Social History     Socioeconomic History    Marital status:     Number of children: 2   Tobacco Use    Smoking status: Former     Packs/day: 0.25     Years: 2.00     Pack years: 0.50     Types: Cigars, Cigarettes     Quit date: 9/28/2012     Years since quitting: 10.6    Smokeless tobacco: Never   Substance and Sexual Activity    Alcohol use: Yes     Alcohol/week: 2.0 standard drinks     Types: 2 Shots of liquor per week     Comment: 1-2 glasses/day    Drug use: No    Sexual activity: Yes     Partners: Female     Family History   Problem Relation Age of Onset    Heart disease Father         73    Hypertension Mother        Review of Systems   Constitutional:  Positive for fatigue. Negative for chills, fever and unexpected weight change.   HENT:  Negative for ear pain and sore throat.    Eyes:  Negative for redness and visual disturbance.   Respiratory:  Negative for cough and shortness of breath.    Cardiovascular:  Negative for chest pain and palpitations.   Gastrointestinal:  Negative for nausea and vomiting.   Endocrine: Negative for cold intolerance and heat intolerance.   Genitourinary:  Negative for difficulty urinating and hematuria.   Musculoskeletal:  Positive for arthralgias. Negative for myalgias and neck pain.   Skin:  Negative for rash and wound.   Allergic/Immunologic: Negative for environmental  allergies and food allergies.   Neurological:  Negative for weakness and headaches.   Hematological:  Negative for adenopathy. Does not bruise/bleed easily.   Psychiatric/Behavioral:  Negative for sleep disturbance. The patient is not nervous/anxious.       Objective:     Vitals:    05/05/23 1054   BP: 138/88   Pulse:    Resp:    Temp:     Body mass index is 32 kg/m².  Physical Exam  Vitals and nursing note reviewed.   Constitutional:       General: He is not in acute distress.     Appearance: He is well-developed.   HENT:      Head: Normocephalic and atraumatic.      Right Ear: External ear normal.      Left Ear: External ear normal.      Nose: Nose normal. No rhinorrhea.   Eyes:      Extraocular Movements: Extraocular movements intact.      Pupils: Pupils are equal, round, and reactive to light.   Cardiovascular:      Rate and Rhythm: Normal rate.      Pulses: Normal pulses.   Pulmonary:      Effort: Pulmonary effort is normal. No respiratory distress.      Breath sounds: Normal breath sounds.   Abdominal:      General: Bowel sounds are normal.      Palpations: Abdomen is soft.   Musculoskeletal:         General: Tenderness present. No deformity. Normal range of motion.      Cervical back: Normal range of motion and neck supple.   Skin:     General: Skin is warm and dry.      Capillary Refill: Capillary refill takes less than 2 seconds.   Neurological:      General: No focal deficit present.      Mental Status: He is alert and oriented to person, place, and time.      Cranial Nerves: No cranial nerve deficit.      Motor: No weakness.      Gait: Gait normal.   Psychiatric:         Mood and Affect: Mood normal.         Behavior: Behavior normal.    + tenderness behind the left knee    Lab Visit on 05/19/2022   Component Date Value Ref Range Status    Hemoglobin 05/19/2022 15.2  14.0 - 18.0 g/dL Final    Sodium 05/19/2022 142  136 - 145 mmol/L Final    Potassium 05/19/2022 4.1  3.5 - 5.1 mmol/L Final    Chloride  05/19/2022 105  95 - 110 mmol/L Final    CO2 05/19/2022 25  23 - 29 mmol/L Final    Glucose 05/19/2022 98  70 - 110 mg/dL Final    BUN 05/19/2022 20  6 - 20 mg/dL Final    Creatinine 05/19/2022 1.4  0.5 - 1.4 mg/dL Final    Calcium 05/19/2022 9.5  8.7 - 10.5 mg/dL Final    Total Protein 05/19/2022 7.2  6.0 - 8.4 g/dL Final    Albumin 05/19/2022 3.9  3.5 - 5.2 g/dL Final    Total Bilirubin 05/19/2022 0.7  0.1 - 1.0 mg/dL Final    Comment: For infants and newborns, interpretation of results should be based  on gestational age, weight and in agreement with clinical  observations.    Premature Infant recommended reference ranges:  Up to 24 hours.............<8.0 mg/dL  Up to 48 hours............<12.0 mg/dL  3-5 days..................<15.0 mg/dL  6-29 days.................<15.0 mg/dL      Alkaline Phosphatase 05/19/2022 68  55 - 135 U/L Final    AST 05/19/2022 23  10 - 40 U/L Final    ALT 05/19/2022 28  10 - 44 U/L Final    Anion Gap 05/19/2022 12  8 - 16 mmol/L Final    eGFR if African American 05/19/2022 >60.0  >60 mL/min/1.73 m^2 Final    eGFR if non African American 05/19/2022 54.6 (A)  >60 mL/min/1.73 m^2 Final    Comment: Calculation used to obtain the estimated glomerular filtration  rate (eGFR) is the CKD-EPI equation.       Cholesterol 05/19/2022 189  120 - 199 mg/dL Final    Comment: The National Cholesterol Education Program (NCEP) has set the  following guidelines (reference ranges) for Cholesterol:  Optimal.....................<200 mg/dL  Borderline High.............200-239 mg/dL  High........................> or = 240 mg/dL      Triglycerides 05/19/2022 92  30 - 150 mg/dL Final    Comment: The National Cholesterol Education Program (NCEP) has set the  following guidelines (reference values) for triglycerides:  Normal......................<150 mg/dL  Borderline High.............150-199 mg/dL  High........................200-499 mg/dL      HDL 05/19/2022 54  40 - 75 mg/dL Final    Comment: The National  Cholesterol Education Program (NCEP) has set the  following guidelines (reference values) for HDL Cholesterol:  Low...............<40 mg/dL  Optimal...........>60 mg/dL      LDL Cholesterol 05/19/2022 116.6  63.0 - 159.0 mg/dL Final    Comment: The National Cholesterol Education Program (NCEP) has set the  following guidelines (reference values) for LDL Cholesterol:  Optimal.......................<130 mg/dL  Borderline High...............130-159 mg/dL  High..........................160-189 mg/dL  Very High.....................>190 mg/dL      HDL/Cholesterol Ratio 05/19/2022 28.6  20.0 - 50.0 % Final    Total Cholesterol/HDL Ratio 05/19/2022 3.5  2.0 - 5.0 Final    Non-HDL Cholesterol 05/19/2022 135  mg/dL Final    Comment: Risk category and Non-HDL cholesterol goals:  Coronary heart disease (CHD)or equivalent (10-year risk of CHD >20%):  Non-HDL cholesterol goal     <130 mg/dL  Two or more CHD risk factors and 10-year risk of CHD <= 20%:  Non-HDL cholesterol goal     <160 mg/dL  0 to 1 CHD risk factor:  Non-HDL cholesterol goal     <190 mg/dL      Hemoglobin A1C 05/19/2022 6.1 (H)  4.0 - 5.6 % Final    Comment: ADA Screening Guidelines:  5.7-6.4%  Consistent with prediabetes  >or=6.5%  Consistent with diabetes    High levels of fetal hemoglobin interfere with the HbA1C  assay. Heterozygous hemoglobin variants (HbS, HgC, etc)do  not significantly interfere with this assay.   However, presence of multiple variants may affect accuracy.      Estimated Avg Glucose 05/19/2022 128  68 - 131 mg/dL Final    PSA, Screen 05/19/2022 1.3  0.00 - 4.00 ng/mL Final    Comment: PSA Expected levels:  Hormonal Therapy: <0.05 ng/ml  Prostatectomy: <0.01 ng/ml  Radiation Therapy: <1.00 ng/ml          Assessment / Plan:    1.  Routine health exam-patient here for annual wellness exam.  Labs ordered.  Health maintenance was reviewed and ordered.  Anticipatory guidance: Don't smoke.  Healthy diet and regular exercise recommended. Vaccine  recommendations discussed.  See orders.  Reviewed Anticipatory guidance, risk factor reduction interventions or counseling as needed, Complete history , physical was completed today.  Complete and thorough medication reconciliation was performed.  Discussed risks and benefits of medications.  Advised patient on orders and health maintenance.  We discussed old records and old labs if available.  Will request any records not available through epic.  Continue current medications listed on your summary sheet.  All questions were answered. Patient had no further concerns. Advised of diagnoses and plan. Follow up as planned or return sooner if symptoms persist or worsen.   Hypogonadism in male:  Referral to Urology for further evaluation and treatment.  Discussed risk and benefits of testosterone replacement therapy.  Patient is interested in options.  Hypertension:  Two week blood pressure check.  Referral to Cardiology for further evaluation with his family history of coronary artery disease.  Previously:  Well controlled on losartan hydrochlorothiazide:Counseled on importance of hypertension disease course, I recommend ongoing Education for DASH-diet and exercise.  Counseled on medication regimen importance of treating high blood pressure.  Please be advised of risk of untreated blood pressure as discussed.  Please advised of ER precautions were given for symptoms of hypertensive urgency and emergency.  Metabolic syndrome:  Recheck labs.  Consider Ozempic or other GLP 1 covered by insurance. We will plan to monitor hemoglobin A1c at designated intervals 3 to 6 months.  I recommend ongoing Education for diabetic diet and exercise protocol.  We will continue to monitor for side effects.    Please be advised of symptoms to monitor for and to notify me immediately if persistent or worsening.  Follow up with Ophthalmology/Optometry and Podiatry at least annually.  Chronic knee pain: Referral to orthopedic knee specialist.   Start Mobic.  Patient states that he had a Baker's cyst on imaging previously.    Orders Placed This Encounter   Procedures    CBC Without Differential     Standing Status:   Future     Standing Expiration Date:   7/3/2024    Comprehensive Metabolic Panel     Standing Status:   Future     Standing Expiration Date:   7/3/2024    TSH     Standing Status:   Future     Standing Expiration Date:   7/3/2024    Hemoglobin A1C     Standing Status:   Future     Standing Expiration Date:   7/3/2024    Lipid Panel     Standing Status:   Future     Standing Expiration Date:   7/3/2024    Urinalysis     Standing Status:   Future     Standing Expiration Date:   7/3/2024     Order Specific Question:   Collection Type     Answer:   Urine, Clean Catch    Vitamin B12     Standing Status:   Future     Standing Expiration Date:   7/3/2024    Folate     Standing Status:   Future     Standing Expiration Date:   7/3/2024    Ambulatory referral/consult to Orthopedics     Standing Status:   Future     Standing Expiration Date:   6/5/2024     Referral Priority:   Routine     Referral Type:   Consultation     Requested Specialty:   Orthopedic Surgery     Number of Visits Requested:   1    Ambulatory referral/consult to Cardiology     Standing Status:   Future     Standing Expiration Date:   6/5/2024     Referral Priority:   Routine     Referral Type:   Consultation     Referral Reason:   Specialty Services Required     Requested Specialty:   Cardiology     Number of Visits Requested:   1       Medications Ordered This Encounter   Medications    losartan-hydrochlorothiazide 100-25 mg (HYZAAR) 100-25 mg per tablet     Sig: Take 1 tablet by mouth once daily.     Dispense:  90 tablet     Refill:  4     Please delete all prior scripts with same name and strength including on holds.    meloxicam (MOBIC) 7.5 MG tablet     Sig: Take 1 tablet (7.5 mg total) by mouth once daily.     Dispense:  90 tablet     Refill:  4      Future Appointments        Date Provider Specialty Appt Notes    5/19/2023  Family Medicine 2 week blood pessure    5/19/2023  Lab     5/23/2023 Real Chino MD Cardiology Metabolic syndrome [E88.81]  Essential hypertension [I10]  Family history of heart disease [Z82.49]    11/7/2023 Cheikh Bourne MD Family Medicine 6 month follow up              Cheikh Bourne MD

## 2023-05-08 ENCOUNTER — LAB VISIT (OUTPATIENT)
Dept: LAB | Facility: HOSPITAL | Age: 58
End: 2023-05-08
Attending: FAMILY MEDICINE
Payer: COMMERCIAL

## 2023-05-08 DIAGNOSIS — Z13.6 ENCOUNTER FOR LIPID SCREENING FOR CARDIOVASCULAR DISEASE: ICD-10-CM

## 2023-05-08 DIAGNOSIS — Z00.00 WELL ADULT EXAM: ICD-10-CM

## 2023-05-08 DIAGNOSIS — R41.3 MEMORY LOSS: ICD-10-CM

## 2023-05-08 DIAGNOSIS — Z79.899 ENCOUNTER FOR LONG-TERM (CURRENT) USE OF MEDICATIONS: ICD-10-CM

## 2023-05-08 DIAGNOSIS — Z13.220 ENCOUNTER FOR LIPID SCREENING FOR CARDIOVASCULAR DISEASE: ICD-10-CM

## 2023-05-08 LAB
ALBUMIN SERPL BCP-MCNC: 3.7 G/DL (ref 3.5–5.2)
ALP SERPL-CCNC: 63 U/L (ref 55–135)
ALT SERPL W/O P-5'-P-CCNC: 24 U/L (ref 10–44)
ANION GAP SERPL CALC-SCNC: 7 MMOL/L (ref 8–16)
AST SERPL-CCNC: 24 U/L (ref 10–40)
BILIRUB SERPL-MCNC: 0.7 MG/DL (ref 0.1–1)
BUN SERPL-MCNC: 18 MG/DL (ref 6–20)
CALCIUM SERPL-MCNC: 9.4 MG/DL (ref 8.7–10.5)
CHLORIDE SERPL-SCNC: 102 MMOL/L (ref 95–110)
CHOLEST SERPL-MCNC: 187 MG/DL (ref 120–199)
CHOLEST/HDLC SERPL: 3.5 {RATIO} (ref 2–5)
CO2 SERPL-SCNC: 29 MMOL/L (ref 23–29)
CREAT SERPL-MCNC: 1 MG/DL (ref 0.5–1.4)
ERYTHROCYTE [DISTWIDTH] IN BLOOD BY AUTOMATED COUNT: 14.3 % (ref 11.5–14.5)
EST. GFR  (NO RACE VARIABLE): >60 ML/MIN/1.73 M^2
ESTIMATED AVG GLUCOSE: 114 MG/DL (ref 68–131)
FOLATE SERPL-MCNC: 5.9 NG/ML (ref 4–24)
GLUCOSE SERPL-MCNC: 104 MG/DL (ref 70–110)
HBA1C MFR BLD: 5.6 % (ref 4–5.6)
HCT VFR BLD AUTO: 46.7 % (ref 40–54)
HDLC SERPL-MCNC: 53 MG/DL (ref 40–75)
HDLC SERPL: 28.3 % (ref 20–50)
HGB BLD-MCNC: 14.8 G/DL (ref 14–18)
LDLC SERPL CALC-MCNC: 118.8 MG/DL (ref 63–159)
MCH RBC QN AUTO: 27.5 PG (ref 27–31)
MCHC RBC AUTO-ENTMCNC: 31.7 G/DL (ref 32–36)
MCV RBC AUTO: 87 FL (ref 82–98)
NONHDLC SERPL-MCNC: 134 MG/DL
PLATELET # BLD AUTO: 259 K/UL (ref 150–450)
PMV BLD AUTO: 13.9 FL (ref 9.2–12.9)
POTASSIUM SERPL-SCNC: 4.8 MMOL/L (ref 3.5–5.1)
PROT SERPL-MCNC: 6.5 G/DL (ref 6–8.4)
RBC # BLD AUTO: 5.39 M/UL (ref 4.6–6.2)
SODIUM SERPL-SCNC: 138 MMOL/L (ref 136–145)
TRIGL SERPL-MCNC: 76 MG/DL (ref 30–150)
TSH SERPL DL<=0.005 MIU/L-ACNC: 1.49 UIU/ML (ref 0.4–4)
VIT B12 SERPL-MCNC: 229 PG/ML (ref 210–950)
WBC # BLD AUTO: 3.98 K/UL (ref 3.9–12.7)

## 2023-05-08 PROCEDURE — 80061 LIPID PANEL: CPT | Performed by: FAMILY MEDICINE

## 2023-05-08 PROCEDURE — 84443 ASSAY THYROID STIM HORMONE: CPT | Performed by: FAMILY MEDICINE

## 2023-05-08 PROCEDURE — 82607 VITAMIN B-12: CPT | Performed by: FAMILY MEDICINE

## 2023-05-08 PROCEDURE — 85027 COMPLETE CBC AUTOMATED: CPT | Performed by: FAMILY MEDICINE

## 2023-05-08 PROCEDURE — 83036 HEMOGLOBIN GLYCOSYLATED A1C: CPT | Performed by: FAMILY MEDICINE

## 2023-05-08 PROCEDURE — 82746 ASSAY OF FOLIC ACID SERUM: CPT | Performed by: FAMILY MEDICINE

## 2023-05-08 PROCEDURE — 80053 COMPREHEN METABOLIC PANEL: CPT | Performed by: FAMILY MEDICINE

## 2023-05-08 PROCEDURE — 36415 COLL VENOUS BLD VENIPUNCTURE: CPT | Mod: PO | Performed by: FAMILY MEDICINE

## 2023-05-09 ENCOUNTER — TELEPHONE (OUTPATIENT)
Dept: FAMILY MEDICINE | Facility: CLINIC | Age: 58
End: 2023-05-09
Payer: COMMERCIAL

## 2023-05-09 DIAGNOSIS — Z00.00 WELL ADULT EXAM: Primary | ICD-10-CM

## 2023-05-09 DIAGNOSIS — Z79.899 ENCOUNTER FOR LONG-TERM (CURRENT) USE OF MEDICATIONS: ICD-10-CM

## 2023-05-09 NOTE — TELEPHONE ENCOUNTER
----- Message from Cheikh Bourne MD sent at 5/9/2023  7:55 AM CDT -----  Regarding: RE: Client Services  Contact: [phone]  Please find out with this is regarding?  ----- Message -----  From: Davin John  Sent: 5/9/2023   6:12 AM CDT  To: Cheikh Bourne MD, Tayla Salamanca Staff  Subject: Client Services                                  Good Morning,     My name is Davin Lopez, I work in the Lab Client Services. We had a problem with some lab work on this patient. If someone from your office could call us at [phone] or [phone] that would be great. Anyone in my department can help.      Thank you,

## 2023-05-09 NOTE — TELEPHONE ENCOUNTER
I have signed for the following orders AND/OR meds.  Please call the patient and ask the patient to schedule the testing AND/OR inform about any medications that were sent.      Orders Placed This Encounter   Procedures    Urinalysis, Reflex to Urine Culture Urine, Clean Catch     Standing Status:   Future     Standing Expiration Date:   11/9/2024     Order Specific Question:   Preferred Collection Type     Answer:   Urine, Clean Catch     Order Specific Question:   Specimen Source     Answer:   Urine

## 2023-05-09 NOTE — TELEPHONE ENCOUNTER
Returned call to pt explain about his urine sample being lost. Verbalized understanding and stated he will drop off another sample next week.

## 2023-05-09 NOTE — PROGRESS NOTES
Make follow-up lab appointment per recommendation below.  Check to see if patient has seen the results through my chart.  If not then,  #CALL THE PATIENT# to discuss results/see if they have questions and document verification of contact. Make F/U appt if needed. [phone]    #My interpretation that was sent to them through [facility]:  Rayo, I have reviewed your recent blood work.     Your complete blood count is stable.  Folate level is low.  Start folic acid supplement 1 milligram daily for B12 level is also low.  Start B12 supplement over-the-counter 1000 micrograms daily.  Your metabolic panel which shows your glucose, kidney function, electrolytes, and liver function is normal.   Thyroid study is normal.   Your cholesterol is stable.    Your hemoglobin A1c is improved.  Keep up the great work!  This test is gold standard screening test for diabetes.  It is a measures 3 months of your average blood sugar.    =========================  Also please address any outstanding health maintenance that may be due: Shingles Vaccine(1 of 2) Never done  COVID-19 Vaccine(2 - Booster for Lobo series) due on 10/20/2021

## 2023-05-09 NOTE — TELEPHONE ENCOUNTER
----- Message from Davin Lopez sent at 5/9/2023  6:11 AM CDT -----  Regarding: Client Services  Contact: [phone]  Good Morning,     My name is Davin Lopez, I work in the Lab Client Services. We had a problem with some lab work on this patient. If someone from your office could call us at [phone] or [phone] that would be great. Anyone in my department can help.      Thank you,

## 2023-05-09 NOTE — TELEPHONE ENCOUNTER
The lab called and stated that they lost the UA specimen. However, I do not see where a ua was ordered yesterday.  Please sign new order and I will call pt to get another one from him.

## 2023-05-09 NOTE — TELEPHONE ENCOUNTER
----- Message from Shaun Paz sent at 5/9/2023  3:36 PM CDT -----  Contact: [phone]  Type:  Patient Returning Call    Who Called:Rayo   Who Left Message for Patient:nurse   Does the patient know what this is regarding?:yes   Would the patient rather a call back or a response via [facility]? Call back   Best Call Back Number:[phone]  Additional Information: n/a      Thanks KB

## 2023-06-28 ENCOUNTER — HOSPITAL ENCOUNTER (OUTPATIENT)
Dept: CARDIOLOGY | Facility: HOSPITAL | Age: 58
Discharge: HOME | End: 2023-06-28
Attending: INTERNAL MEDICINE
Payer: COMMERCIAL

## 2023-06-28 ENCOUNTER — OFFICE VISIT (OUTPATIENT)
Dept: CARDIOLOGY | Facility: CLINIC | Age: 58
End: 2023-06-28
Payer: COMMERCIAL

## 2023-06-28 VITALS
HEIGHT: 70 IN | OXYGEN SATURATION: 97 % | HEART RATE: 64 BPM | SYSTOLIC BLOOD PRESSURE: 116 MMHG | BODY MASS INDEX: 31.07 KG/M2 | DIASTOLIC BLOOD PRESSURE: 74 MMHG | WEIGHT: 217 LBS

## 2023-06-28 DIAGNOSIS — G47.33 OSA (OBSTRUCTIVE SLEEP APNEA): Primary | ICD-10-CM

## 2023-06-28 DIAGNOSIS — I10 ESSENTIAL HYPERTENSION: ICD-10-CM

## 2023-06-28 DIAGNOSIS — E66.09 CLASS 1 OBESITY DUE TO EXCESS CALORIES WITHOUT SERIOUS COMORBIDITY WITH BODY MASS INDEX (BMI) OF 34.0 TO 34.9 IN ADULT: ICD-10-CM

## 2023-06-28 DIAGNOSIS — Z82.49 FAMILY HISTORY OF HEART DISEASE: ICD-10-CM

## 2023-06-28 DIAGNOSIS — E88.810 METABOLIC SYNDROME: ICD-10-CM

## 2023-06-28 DIAGNOSIS — R94.31 ABNORMAL EKG: ICD-10-CM

## 2023-06-28 PROCEDURE — 3074F PR MOST RECENT SYSTOLIC BLOOD PRESSURE < 130 MM HG: ICD-10-PCS | Mod: CPTII,S$GLB,, | Performed by: INTERNAL MEDICINE

## 2023-06-28 PROCEDURE — 99205 OFFICE O/P NEW HI 60 MIN: CPT | Mod: 25,S$GLB,, | Performed by: INTERNAL MEDICINE

## 2023-06-28 PROCEDURE — 3078F PR MOST RECENT DIASTOLIC BLOOD PRESSURE < 80 MM HG: ICD-10-PCS | Mod: CPTII,S$GLB,, | Performed by: INTERNAL MEDICINE

## 2023-06-28 PROCEDURE — 3078F DIAST BP <80 MM HG: CPT | Mod: CPTII,S$GLB,, | Performed by: INTERNAL MEDICINE

## 2023-06-28 PROCEDURE — 1160F RVW MEDS BY RX/DR IN RCRD: CPT | Mod: CPTII,S$GLB,, | Performed by: INTERNAL MEDICINE

## 2023-06-28 PROCEDURE — 99999 PR PBB SHADOW E&M-EST. PATIENT-LVL IV: CPT | Mod: PBBFAC,,, | Performed by: INTERNAL MEDICINE

## 2023-06-28 PROCEDURE — 93005 ELECTROCARDIOGRAM TRACING: CPT | Mod: PO

## 2023-06-28 PROCEDURE — 3008F PR BODY MASS INDEX (BMI) DOCUMENTED: ICD-10-PCS | Mod: CPTII,S$GLB,, | Performed by: INTERNAL MEDICINE

## 2023-06-28 PROCEDURE — 99999 PR PBB SHADOW E&M-EST. PATIENT-LVL IV: ICD-10-PCS | Mod: PBBFAC,,, | Performed by: INTERNAL MEDICINE

## 2023-06-28 PROCEDURE — 3074F SYST BP LT 130 MM HG: CPT | Mod: CPTII,S$GLB,, | Performed by: INTERNAL MEDICINE

## 2023-06-28 PROCEDURE — 3044F HG A1C LEVEL LT 7.0%: CPT | Mod: CPTII,S$GLB,, | Performed by: INTERNAL MEDICINE

## 2023-06-28 PROCEDURE — 3044F PR MOST RECENT HEMOGLOBIN A1C LEVEL <7.0%: ICD-10-PCS | Mod: CPTII,S$GLB,, | Performed by: INTERNAL MEDICINE

## 2023-06-28 PROCEDURE — 93010 EKG 12-LEAD: ICD-10-PCS | Mod: ,,, | Performed by: INTERNAL MEDICINE

## 2023-06-28 PROCEDURE — 93010 ELECTROCARDIOGRAM REPORT: CPT | Mod: ,,, | Performed by: INTERNAL MEDICINE

## 2023-06-28 PROCEDURE — 1159F PR MEDICATION LIST DOCUMENTED IN MEDICAL RECORD: ICD-10-PCS | Mod: CPTII,S$GLB,, | Performed by: INTERNAL MEDICINE

## 2023-06-28 PROCEDURE — 1159F MED LIST DOCD IN RCRD: CPT | Mod: CPTII,S$GLB,, | Performed by: INTERNAL MEDICINE

## 2023-06-28 PROCEDURE — 1160F PR REVIEW ALL MEDS BY PRESCRIBER/CLIN PHARMACIST DOCUMENTED: ICD-10-PCS | Mod: CPTII,S$GLB,, | Performed by: INTERNAL MEDICINE

## 2023-06-28 PROCEDURE — 3008F BODY MASS INDEX DOCD: CPT | Mod: CPTII,S$GLB,, | Performed by: INTERNAL MEDICINE

## 2023-06-28 PROCEDURE — 99205 PR OFFICE/OUTPT VISIT, NEW, LEVL V, 60-74 MIN: ICD-10-PCS | Mod: 25,S$GLB,, | Performed by: INTERNAL MEDICINE

## 2023-06-28 NOTE — PROGRESS NOTES
Subjective:   Patient ID:  Ken Lozord Barthelemy is a 58 y.o. male who presents for follow-up of Establish Care and Fatigue  Pt presents for CV evaluation. Pt with family hx of CAD.  Patient denies CP, angina or anginal equivalent.   Pt with noted fatigue x 1 year.  Pt active , HS football officiating and at work.EKG NSR, possible inferior scar    CRF- male/age, family hx of CAD, HTN    Hypertension  This is a chronic problem. The current episode started more than 1 year ago. The problem has been gradually improving since onset. The problem is controlled. Pertinent negatives include no chest pain, palpitations or shortness of breath. Past treatments include ACE inhibitors and diuretics. The current treatment provides moderate improvement. There are no compliance problems.    Fatigue  This is a chronic problem. The current episode started more than 1 year ago. The problem occurs rarely. Associated symptoms include fatigue. Pertinent negatives include no chest pain. Nothing aggravates the symptoms. He has tried rest for the symptoms. The treatment provided moderate relief.     Review of Systems   Constitutional: Positive for fatigue. Negative for weight gain.   HENT: Negative.     Eyes: Negative.    Cardiovascular: Negative.  Negative for chest pain, leg swelling and palpitations.   Respiratory: Negative.  Negative for shortness of breath.    Endocrine: Negative.    Hematologic/Lymphatic: Negative.    Skin: Negative.    Musculoskeletal:  Negative for muscle weakness.   Gastrointestinal: Negative.    Genitourinary: Negative.    Neurological: Negative.  Negative for dizziness.   Psychiatric/Behavioral: Negative.     Allergic/Immunologic: Negative.    All other systems reviewed and are negative.  Family History   Problem Relation Age of Onset    Heart disease Father         73    Hypertension Mother      Past Medical History:   Diagnosis Date    Diverticulitis 2005    May 2005 and Nov 2005    Hypertension      Social  History     Socioeconomic History    Marital status:     Number of children: 2   Tobacco Use    Smoking status: Former     Packs/day: 0.25     Years: 2.00     Pack years: 0.50     Types: Cigars, Cigarettes     Quit date: 9/28/2012     Years since quitting: 10.7    Smokeless tobacco: Never   Substance and Sexual Activity    Alcohol use: Yes     Alcohol/week: 2.0 standard drinks     Types: 2 Shots of liquor per week     Comment: 1-2 glasses/day    Drug use: No    Sexual activity: Yes     Partners: Female     Current Outpatient Medications on File Prior to Visit   Medication Sig Dispense Refill    losartan-hydrochlorothiazide 100-25 mg (HYZAAR) 100-25 mg per tablet Take 1 tablet by mouth once daily. 90 tablet 4    [DISCONTINUED] meloxicam (MOBIC) 7.5 MG tablet Take 1 tablet (7.5 mg total) by mouth once daily. 90 tablet 4     No current facility-administered medications on file prior to visit.     Review of patient's allergies indicates:  No Known Allergies    Objective:     Physical Exam  Vitals and nursing note reviewed.   Constitutional:       Appearance: He is well-developed.   HENT:      Head: Normocephalic and atraumatic.   Eyes:      Conjunctiva/sclera: Conjunctivae normal.      Pupils: Pupils are equal, round, and reactive to light.   Cardiovascular:      Rate and Rhythm: Normal rate and regular rhythm.      Pulses: Intact distal pulses.      Heart sounds: Normal heart sounds.   Pulmonary:      Effort: Pulmonary effort is normal.      Breath sounds: Normal breath sounds.   Abdominal:      General: Bowel sounds are normal.      Palpations: Abdomen is soft.   Musculoskeletal:      Cervical back: Normal range of motion and neck supple.   Skin:     General: Skin is warm and dry.   Neurological:      Mental Status: He is alert and oriented to person, place, and time.       Assessment:     1. REENA (obstructive sleep apnea)    2. Metabolic syndrome    3. Essential hypertension    4. Family history of heart  disease    5. Class 1 obesity due to excess calories without serious comorbidity with body mass index (BMI) of 34.0 to 34.9 in adult    6. Abormal EKG    Plan:     REENA (obstructive sleep apnea)    Metabolic syndrome  -     Ambulatory referral/consult to Cardiology    Essential hypertension  -     Ambulatory referral/consult to Cardiology    Family history of heart disease  -     Ambulatory referral/consult to Cardiology    Class 1 obesity due to excess calories without serious comorbidity with body mass index (BMI) of 34.0 to 34.9 in adult      Stress test, echo  Continue losartan-hctz-htn  Check lipids

## 2023-07-18 ENCOUNTER — HOSPITAL ENCOUNTER (OUTPATIENT)
Dept: CARDIOLOGY | Facility: HOSPITAL | Age: 58
Discharge: HOME | End: 2023-07-18
Attending: INTERNAL MEDICINE
Payer: COMMERCIAL

## 2023-07-18 VITALS
SYSTOLIC BLOOD PRESSURE: 116 MMHG | WEIGHT: 217 LBS | DIASTOLIC BLOOD PRESSURE: 74 MMHG | HEART RATE: 59 BPM | HEIGHT: 70 IN | BODY MASS INDEX: 31.07 KG/M2

## 2023-07-18 DIAGNOSIS — I10 ESSENTIAL HYPERTENSION: ICD-10-CM

## 2023-07-18 DIAGNOSIS — Z82.49 FAMILY HISTORY OF HEART DISEASE: ICD-10-CM

## 2023-07-18 LAB
AORTIC ROOT ANNULUS: 3.31 CM
ASCENDING AORTA: 2.88 CM
AV INDEX (PROSTH): 0.69
AV MEAN GRADIENT: 3 MMHG
AV PEAK GRADIENT: 6 MMHG
AV VALVE AREA: 2.78 CM2
AV VELOCITY RATIO: 0.76
BSA FOR ECHO PROCEDURE: 2.2 M2
CV ECHO LV RWT: 0.38 CM
CV STRESS BASE HR: 77 BPM
DIASTOLIC BLOOD PRESSURE: 101 MMHG
DOP CALC AO PEAK VEL: 1.21 M/S
DOP CALC AO VTI: 30.1 CM
DOP CALC LVOT AREA: 4 CM2
DOP CALC LVOT DIAMETER: 2.27 CM
DOP CALC LVOT PEAK VEL: 0.92 M/S
DOP CALC LVOT STROKE VOLUME: 83.73 CM3
DOP CALC RVOT PEAK VEL: 0.55 M/S
DOP CALC RVOT VTI: 14.5 CM
DOP CALCLVOT PEAK VEL VTI: 20.7 CM
E WAVE DECELERATION TIME: 166.88 MSEC
E/A RATIO: 0.92
E/E' RATIO: 8.31 M/S
ECHO LV POSTERIOR WALL: 0.85 CM (ref 0.6–1.1)
EJECTION FRACTION: 60 %
FRACTIONAL SHORTENING: 33 % (ref 28–44)
INTERVENTRICULAR SEPTUM: 1.23 CM (ref 0.6–1.1)
IVC DIAMETER: 1.7 CM
IVRT: 105.61 MSEC
LA MAJOR: 5.65 CM
LA MINOR: 5.02 CM
LA WIDTH: 3.9 CM
LEFT ATRIUM SIZE: 3.45 CM
LEFT ATRIUM VOLUME INDEX MOD: 27.7 ML/M2
LEFT ATRIUM VOLUME INDEX: 28.1 ML/M2
LEFT ATRIUM VOLUME MOD: 59.91 CM3
LEFT ATRIUM VOLUME: 60.8 CM3
LEFT INTERNAL DIMENSION IN SYSTOLE: 2.98 CM (ref 2.1–4)
LEFT VENTRICLE DIASTOLIC VOLUME INDEX: 41.18 ML/M2
LEFT VENTRICLE DIASTOLIC VOLUME: 88.95 ML
LEFT VENTRICLE MASS INDEX: 73 G/M2
LEFT VENTRICLE SYSTOLIC VOLUME INDEX: 15.9 ML/M2
LEFT VENTRICLE SYSTOLIC VOLUME: 34.44 ML
LEFT VENTRICULAR INTERNAL DIMENSION IN DIASTOLE: 4.43 CM (ref 3.5–6)
LEFT VENTRICULAR MASS: 157.81 G
LV LATERAL E/E' RATIO: 6.75 M/S
LV SEPTAL E/E' RATIO: 10.8 M/S
LVOT MG: 1.75 MMHG
LVOT MV: 0.61 CM/S
MV PEAK A VEL: 0.59 M/S
MV PEAK E VEL: 0.54 M/S
MV STENOSIS PRESSURE HALF TIME: 48.4 MS
MV VALVE AREA P 1/2 METHOD: 4.55 CM2
OHS CV CPX 1 MINUTE RECOVERY HEART RATE: 125 BPM
OHS CV CPX 85 PERCENT MAX PREDICTED HEART RATE MALE: 136
OHS CV CPX ESTIMATED METS: 10
OHS CV CPX MAX PREDICTED HEART RATE: 160
OHS CV CPX PATIENT IS FEMALE: 0
OHS CV CPX PATIENT IS MALE: 1
OHS CV CPX PEAK DIASTOLIC BLOOD PRESSURE: 88 MMHG
OHS CV CPX PEAK HEAR RATE: 146 BPM
OHS CV CPX PEAK RATE PRESSURE PRODUCT: NORMAL
OHS CV CPX PEAK SYSTOLIC BLOOD PRESSURE: 164 MMHG
OHS CV CPX PERCENT MAX PREDICTED HEART RATE ACHIEVED: 91
OHS CV CPX RATE PRESSURE PRODUCT PRESENTING: NORMAL
PISA TR MAX VEL: 2.49 M/S
PULM VEIN S/D RATIO: 1.95
PV MEAN GRADIENT: 0.76 MMHG
PV PEAK D VEL: 0.39 M/S
PV PEAK S VEL: 0.76 M/S
PV PEAK VELOCITY: 1.17 CM/S
RA MAJOR: 4.6 CM
RA PRESSURE: 3 MMHG
RIGHT VENTRICULAR END-DIASTOLIC DIMENSION: 3.48 CM
STJ: 2.84 CM
STRESS ECHO POST EXERCISE DUR MIN: 8 MINUTES
STRESS ST DEPRESSION: 1 MM
SYSTOLIC BLOOD PRESSURE: 145 MMHG
TDI LATERAL: 0.08 M/S
TDI SEPTAL: 0.05 M/S
TDI: 0.07 M/S
TR MAX PG: 25 MMHG
TRICUSPID ANNULAR PLANE SYSTOLIC EXCURSION: 2.14 CM
TV REST PULMONARY ARTERY PRESSURE: 28 MMHG

## 2023-07-18 PROCEDURE — 93306 TTE W/DOPPLER COMPLETE: CPT | Mod: 26,,, | Performed by: INTERNAL MEDICINE

## 2023-07-18 PROCEDURE — 93016 EXERCISE STRESS - EKG (CUPID ONLY): ICD-10-PCS | Mod: ,,, | Performed by: INTERNAL MEDICINE

## 2023-07-18 PROCEDURE — 93018 CV STRESS TEST I&R ONLY: CPT | Mod: ,,, | Performed by: INTERNAL MEDICINE

## 2023-07-18 PROCEDURE — 93016 CV STRESS TEST SUPVJ ONLY: CPT | Mod: ,,, | Performed by: INTERNAL MEDICINE

## 2023-07-18 PROCEDURE — 93306 ECHO (CUPID ONLY): ICD-10-PCS | Mod: 26,,, | Performed by: INTERNAL MEDICINE

## 2023-07-18 PROCEDURE — 93018 EXERCISE STRESS - EKG (CUPID ONLY): ICD-10-PCS | Mod: ,,, | Performed by: INTERNAL MEDICINE

## 2023-07-18 PROCEDURE — 93017 CV STRESS TEST TRACING ONLY: CPT | Mod: PO

## 2023-07-18 PROCEDURE — 93306 TTE W/DOPPLER COMPLETE: CPT | Mod: PO

## 2023-07-19 ENCOUNTER — TELEPHONE (OUTPATIENT)
Dept: CARDIOLOGY | Facility: CLINIC | Age: 58
End: 2023-07-19
Payer: COMMERCIAL

## 2023-07-19 NOTE — TELEPHONE ENCOUNTER
Spoke with pt and discussed stress test and echo results. Pt verbalized understanding and will call back with any further questions or concerns.     ----- Message from Real Chino MD sent at 7/19/2023  9:49 AM CDT -----  Please tell pt:  Stress test and echo is negative

## 2023-09-20 ENCOUNTER — OFFICE VISIT (OUTPATIENT)
Dept: CARDIOLOGY | Facility: CLINIC | Age: 58
End: 2023-09-20
Payer: COMMERCIAL

## 2023-09-20 VITALS
OXYGEN SATURATION: 98 % | BODY MASS INDEX: 31.95 KG/M2 | SYSTOLIC BLOOD PRESSURE: 124 MMHG | WEIGHT: 223.19 LBS | DIASTOLIC BLOOD PRESSURE: 74 MMHG | HEIGHT: 70 IN | HEART RATE: 58 BPM

## 2023-09-20 DIAGNOSIS — R94.31 ABNORMAL EKG: ICD-10-CM

## 2023-09-20 DIAGNOSIS — G47.33 OSA (OBSTRUCTIVE SLEEP APNEA): ICD-10-CM

## 2023-09-20 DIAGNOSIS — I10 ESSENTIAL HYPERTENSION: Primary | ICD-10-CM

## 2023-09-20 DIAGNOSIS — E66.09 CLASS 1 OBESITY DUE TO EXCESS CALORIES WITHOUT SERIOUS COMORBIDITY WITH BODY MASS INDEX (BMI) OF 34.0 TO 34.9 IN ADULT: ICD-10-CM

## 2023-09-20 PROCEDURE — 3074F PR MOST RECENT SYSTOLIC BLOOD PRESSURE < 130 MM HG: ICD-10-PCS | Mod: CPTII,S$GLB,, | Performed by: INTERNAL MEDICINE

## 2023-09-20 PROCEDURE — 3044F PR MOST RECENT HEMOGLOBIN A1C LEVEL <7.0%: ICD-10-PCS | Mod: CPTII,S$GLB,, | Performed by: INTERNAL MEDICINE

## 2023-09-20 PROCEDURE — 99999 PR PBB SHADOW E&M-EST. PATIENT-LVL III: ICD-10-PCS | Mod: PBBFAC,,, | Performed by: INTERNAL MEDICINE

## 2023-09-20 PROCEDURE — 3074F SYST BP LT 130 MM HG: CPT | Mod: CPTII,S$GLB,, | Performed by: INTERNAL MEDICINE

## 2023-09-20 PROCEDURE — 99999 PR PBB SHADOW E&M-EST. PATIENT-LVL III: CPT | Mod: PBBFAC,,, | Performed by: INTERNAL MEDICINE

## 2023-09-20 PROCEDURE — 1159F MED LIST DOCD IN RCRD: CPT | Mod: CPTII,S$GLB,, | Performed by: INTERNAL MEDICINE

## 2023-09-20 PROCEDURE — 3044F HG A1C LEVEL LT 7.0%: CPT | Mod: CPTII,S$GLB,, | Performed by: INTERNAL MEDICINE

## 2023-09-20 PROCEDURE — 3008F BODY MASS INDEX DOCD: CPT | Mod: CPTII,S$GLB,, | Performed by: INTERNAL MEDICINE

## 2023-09-20 PROCEDURE — 3078F PR MOST RECENT DIASTOLIC BLOOD PRESSURE < 80 MM HG: ICD-10-PCS | Mod: CPTII,S$GLB,, | Performed by: INTERNAL MEDICINE

## 2023-09-20 PROCEDURE — 1159F PR MEDICATION LIST DOCUMENTED IN MEDICAL RECORD: ICD-10-PCS | Mod: CPTII,S$GLB,, | Performed by: INTERNAL MEDICINE

## 2023-09-20 PROCEDURE — 3078F DIAST BP <80 MM HG: CPT | Mod: CPTII,S$GLB,, | Performed by: INTERNAL MEDICINE

## 2023-09-20 PROCEDURE — 3008F PR BODY MASS INDEX (BMI) DOCUMENTED: ICD-10-PCS | Mod: CPTII,S$GLB,, | Performed by: INTERNAL MEDICINE

## 2023-09-20 PROCEDURE — 99214 PR OFFICE/OUTPT VISIT, EST, LEVL IV, 30-39 MIN: ICD-10-PCS | Mod: S$GLB,,, | Performed by: INTERNAL MEDICINE

## 2023-09-20 PROCEDURE — 99214 OFFICE O/P EST MOD 30 MIN: CPT | Mod: S$GLB,,, | Performed by: INTERNAL MEDICINE

## 2023-09-20 NOTE — PROGRESS NOTES
Subjective:   Patient ID:  Ken Lozord Barthelemy is a 58 y.o. male who presents for follow-up of No chief complaint on file.  Last clinic note:    Pt presents for CV evaluation. Pt with family hx of CAD.  Patient denies CP, angina or anginal equivalent.   Pt with noted fatigue x 1 year.  Pt active , HS football officiating and at work.EKG NSR, possible inferior scar     CRF- male/age, family hx of CAD, HTN    Today:  Stress test and echo nml  Patient denies CP, angina or anginal equivalent.   Pt with fatigue, works days and nights  Lipids are at goal  Hypertension  This is a chronic problem. The current episode started more than 1 year ago. The problem has been gradually improving since onset. The problem is controlled. Pertinent negatives include no chest pain, palpitations or shortness of breath. Past treatments include ACE inhibitors and diuretics. The current treatment provides moderate improvement. There are no compliance problems.    Fatigue  This is a chronic problem. The current episode started more than 1 year ago. The problem occurs rarely. Associated symptoms include fatigue. Pertinent negatives include no chest pain. Nothing aggravates the symptoms. He has tried rest for the symptoms. The treatment provided moderate relief.       Review of Systems   Constitutional: Positive for fatigue. Negative for weight gain.   HENT: Negative.     Eyes: Negative.    Cardiovascular: Negative.  Negative for chest pain, leg swelling and palpitations.   Respiratory: Negative.  Negative for shortness of breath.    Endocrine: Negative.    Hematologic/Lymphatic: Negative.    Skin: Negative.    Musculoskeletal:  Negative for muscle weakness.   Gastrointestinal: Negative.    Genitourinary: Negative.    Neurological: Negative.  Negative for dizziness.   Psychiatric/Behavioral: Negative.     Allergic/Immunologic: Negative.    All other systems reviewed and are negative.    Family History   Problem Relation Age of Onset    Heart  disease Father         73    Hypertension Mother      Past Medical History:   Diagnosis Date    Diverticulitis 2005    May 2005 and Nov 2005    Hypertension      Social History     Socioeconomic History    Marital status:     Number of children: 2   Tobacco Use    Smoking status: Former     Current packs/day: 0.00     Average packs/day: 0.3 packs/day for 2.0 years (0.5 total pack years)     Types: Cigars, Cigarettes     Start date: 9/28/2010     Quit date: 9/28/2012     Years since quitting: 10.9    Smokeless tobacco: Never   Substance and Sexual Activity    Alcohol use: Yes     Alcohol/week: 2.0 standard drinks of alcohol     Types: 2 Shots of liquor per week     Comment: 1-2 glasses/day    Drug use: No    Sexual activity: Yes     Partners: Female     Current Outpatient Medications on File Prior to Visit   Medication Sig Dispense Refill    losartan-hydrochlorothiazide 100-25 mg (HYZAAR) 100-25 mg per tablet Take 1 tablet by mouth once daily. 90 tablet 4     No current facility-administered medications on file prior to visit.     Review of patient's allergies indicates:  No Known Allergies    Objective:     Physical Exam  Vitals and nursing note reviewed.   Constitutional:       Appearance: He is well-developed.   HENT:      Head: Normocephalic and atraumatic.   Eyes:      Conjunctiva/sclera: Conjunctivae normal.      Pupils: Pupils are equal, round, and reactive to light.   Cardiovascular:      Rate and Rhythm: Normal rate and regular rhythm.      Pulses: Intact distal pulses.      Heart sounds: Normal heart sounds.   Pulmonary:      Effort: Pulmonary effort is normal.      Breath sounds: Normal breath sounds.   Abdominal:      General: Bowel sounds are normal.      Palpations: Abdomen is soft.   Musculoskeletal:      Cervical back: Normal range of motion and neck supple.   Skin:     General: Skin is warm and dry.   Neurological:      Mental Status: He is alert and oriented to person, place, and time.          Assessment:     1. Essential hypertension    2. Abnormal EKG    3. Class 1 obesity due to excess calories without serious comorbidity with body mass index (BMI) of 34.0 to 34.9 in adult    4. REENA (obstructive sleep apnea)        Plan:     Essential hypertension    Abnormal EKG    Class 1 obesity due to excess calories without serious comorbidity with body mass index (BMI) of 34.0 to 34.9 in adult    REENA (obstructive sleep apnea)      Continue losartan-hctz-htn  Check lipids

## 2024-05-18 ENCOUNTER — PATIENT MESSAGE (OUTPATIENT)
Dept: FAMILY MEDICINE | Facility: CLINIC | Age: 59
End: 2024-05-18
Payer: COMMERCIAL

## 2024-05-24 ENCOUNTER — E-VISIT (OUTPATIENT)
Dept: FAMILY MEDICINE | Facility: CLINIC | Age: 59
End: 2024-05-24
Payer: COMMERCIAL

## 2024-05-24 DIAGNOSIS — H10.9 CONJUNCTIVITIS OF RIGHT EYE, UNSPECIFIED CONJUNCTIVITIS TYPE: Primary | ICD-10-CM

## 2024-05-24 PROCEDURE — 99422 OL DIG E/M SVC 11-20 MIN: CPT | Mod: ,,, | Performed by: FAMILY MEDICINE

## 2024-05-24 RX ORDER — POLYMYXIN B SULFATE AND TRIMETHOPRIM 1; 10000 MG/ML; [USP'U]/ML
1 SOLUTION OPHTHALMIC 4 TIMES DAILY
Qty: 10 ML | Refills: 0 | Status: SHIPPED | OUTPATIENT
Start: 2024-05-24

## 2024-05-24 NOTE — PROGRESS NOTES
Patient ID: Ken Lozord Barthelemy is a 59 y.o. male.    Chief Complaint: Rash (Entered automatically based on patient selection in Patient Portal.)    The patient initiated a request through [employer] on 5/24/2024 for evaluation and management with a chief complaint of Rash (Entered automatically based on patient selection in Patient Portal.)     I evaluated the questionnaire submission on 5/24/24.    Ohs Peq Evisit Rash    5/24/2024 11:19 AM CDT - Filed by Patient   Do you agree to participate in an E-Visit? Yes   If you have any of the following symptoms, please present to your local emergency room or call 911:  I acknowledge   What is the main issue you would like addressed today? Swelling on eyes   How would you describe your skin problem? Lump or bump   When did your symptoms first appear? 5/23/2024   Where is it located?  Face   Does it itch? No   Does it hurt? No   Is there discharge or drainage? No   Is there bleeding? No   Describe the character Raised   Describe the color Brown   Has it changed over time? No change   Frequency of skin problem Seasonally   Duration of the skin problem (how long does it stay when it is present) Hours   I have had a new exposure to [employer]   I have had a new exposure to [employer]   What have you used to treat the skin problem? Nothing   If you have used anything for treatment, has it helped the symptoms? No   Other generalized symptoms that you associate with the rash Runny nose   Provide any additional information you feel is important.    At least one photo is required for treatment to be provided. You can upload a maximum of three photos of the affected area.     Are you able to take your vital signs? No         Encounter Diagnosis   Name Primary?    Conjunctivitis of right eye, unspecified conjunctivitis type Yes        No orders of the defined types were placed in this encounter.     Medications Ordered This Encounter   Medications    polymyxin B sulf-trimethoprim  (POLYTRIM) 10,000 unit- 1 mg/mL Drop     Sig: Place 1 drop into the right eye 4 (four) times daily.     Dispense:  10 mL     Refill:  0        No follow-ups on file.      E-Visit Time Trackin mins

## 2024-09-11 ENCOUNTER — PATIENT MESSAGE (OUTPATIENT)
Dept: FAMILY MEDICINE | Facility: CLINIC | Age: 59
End: 2024-09-11
Payer: COMMERCIAL

## 2025-06-23 ENCOUNTER — PATIENT OUTREACH (OUTPATIENT)
Dept: ADMINISTRATIVE | Facility: HOSPITAL | Age: 60
End: 2025-06-23
Payer: COMMERCIAL